# Patient Record
Sex: FEMALE | Race: WHITE | Employment: FULL TIME | URBAN - METROPOLITAN AREA
[De-identification: names, ages, dates, MRNs, and addresses within clinical notes are randomized per-mention and may not be internally consistent; named-entity substitution may affect disease eponyms.]

---

## 2017-02-14 ENCOUNTER — HOSPITAL ENCOUNTER (EMERGENCY)
Age: 32
Discharge: HOME | End: 2017-02-14
Attending: EMERGENCY MEDICINE
Payer: MEDICARE

## 2017-02-14 VITALS
WEIGHT: 230 LBS | RESPIRATION RATE: 16 BRPM | OXYGEN SATURATION: 99 % | DIASTOLIC BLOOD PRESSURE: 83 MMHG | TEMPERATURE: 98.1 F | HEART RATE: 99 BPM | BODY MASS INDEX: 43.43 KG/M2 | HEIGHT: 61 IN | SYSTOLIC BLOOD PRESSURE: 150 MMHG

## 2017-02-14 DIAGNOSIS — H92.02 EAR PAIN, LEFT: Primary | ICD-10-CM

## 2017-02-14 PROCEDURE — 99282 EMERGENCY DEPT VISIT SF MDM: CPT

## 2017-02-14 RX ORDER — TRAMADOL HYDROCHLORIDE 50 MG/1
50 TABLET ORAL EVERY 6 HOURS PRN
Qty: 10 TABLET | Refills: 0 | Status: SHIPPED | OUTPATIENT
Start: 2017-02-14 | End: 2017-02-24

## 2017-02-14 ASSESSMENT — ENCOUNTER SYMPTOMS
COUGH: 0
EYE PAIN: 0
VOMITING: 0
NAUSEA: 0
WHEEZING: 0
SORE THROAT: 0
SPUTUM PRODUCTION: 0
SHORTNESS OF BREATH: 0
ABDOMINAL PAIN: 0

## 2017-02-14 ASSESSMENT — PAIN DESCRIPTION - LOCATION: LOCATION: EAR

## 2017-02-14 ASSESSMENT — PAIN SCALES - GENERAL: PAINLEVEL_OUTOF10: 4

## 2017-02-14 ASSESSMENT — PAIN DESCRIPTION - PAIN TYPE: TYPE: ACUTE PAIN

## 2017-02-14 ASSESSMENT — PAIN DESCRIPTION - DESCRIPTORS: DESCRIPTORS: THROBBING

## 2017-02-14 ASSESSMENT — PAIN DESCRIPTION - ORIENTATION: ORIENTATION: LEFT

## 2019-11-18 ENCOUNTER — HOSPITAL ENCOUNTER (OUTPATIENT)
Age: 34
Discharge: HOME | End: 2019-11-18

## 2019-11-18 LAB — RUBV IGG SER QL: 104 IU/ML

## 2019-11-18 PROCEDURE — 86762 RUBELLA ANTIBODY: CPT

## 2019-11-18 PROCEDURE — 86481 TB AG RESPONSE T-CELL SUSP: CPT

## 2019-11-18 PROCEDURE — 86765 RUBEOLA ANTIBODY: CPT

## 2019-11-18 PROCEDURE — 86787 VARICELLA-ZOSTER ANTIBODY: CPT

## 2019-11-18 PROCEDURE — 86735 MUMPS ANTIBODY: CPT

## 2019-11-20 LAB
MEASLES IMMUNE (IGG): 2.15
MUV IGG SER QL: 0.65
VZV IGG SER QL IA: 1.38

## 2019-11-21 LAB — T-SPOT TB TEST: NORMAL

## 2020-02-18 ENCOUNTER — OFFICE VISIT (OUTPATIENT)
Dept: FAMILY MEDICINE CLINIC | Age: 35
End: 2020-02-18
Payer: COMMERCIAL

## 2020-02-18 VITALS
BODY MASS INDEX: 41.94 KG/M2 | TEMPERATURE: 97.8 F | WEIGHT: 213.6 LBS | HEART RATE: 69 BPM | HEIGHT: 60 IN | DIASTOLIC BLOOD PRESSURE: 89 MMHG | RESPIRATION RATE: 16 BRPM | SYSTOLIC BLOOD PRESSURE: 125 MMHG

## 2020-02-18 PROBLEM — Z82.49 FAMILY HX OF HYPERTENSION: Status: ACTIVE | Noted: 2020-02-18

## 2020-02-18 PROBLEM — F41.9 ANXIETY AND DEPRESSION: Status: ACTIVE | Noted: 2020-02-18

## 2020-02-18 PROBLEM — Z81.8 FAMILY HISTORY OF DEPRESSION: Status: ACTIVE | Noted: 2020-02-18

## 2020-02-18 PROBLEM — Z83.49 FAMILY HISTORY OF THYROID DISORDER: Status: ACTIVE | Noted: 2020-02-18

## 2020-02-18 PROBLEM — Z82.49 FAMILY HISTORY OF CORONARY ARTERY DISEASE: Status: ACTIVE | Noted: 2020-02-18

## 2020-02-18 PROBLEM — Z98.890 HISTORY OF REMOVAL OF URETERAL STENT: Status: ACTIVE | Noted: 2020-02-18

## 2020-02-18 PROBLEM — Z87.42 HISTORY OF PCOS: Status: ACTIVE | Noted: 2020-02-18

## 2020-02-18 PROBLEM — Z90.49 HX OF APPENDECTOMY: Status: ACTIVE | Noted: 2020-02-18

## 2020-02-18 PROBLEM — F32.A ANXIETY AND DEPRESSION: Status: ACTIVE | Noted: 2020-02-18

## 2020-02-18 PROBLEM — Z84.1 FAMILY HISTORY OF KIDNEY STONE: Status: ACTIVE | Noted: 2020-02-18

## 2020-02-18 PROCEDURE — 99204 OFFICE O/P NEW MOD 45 MIN: CPT | Performed by: PHYSICIAN ASSISTANT

## 2020-02-18 PROCEDURE — G0444 DEPRESSION SCREEN ANNUAL: HCPCS | Performed by: PHYSICIAN ASSISTANT

## 2020-02-18 RX ORDER — SPIRONOLACTONE 50 MG/1
50 TABLET, FILM COATED ORAL DAILY
COMMUNITY
End: 2020-12-14

## 2020-02-18 RX ORDER — IBUPROFEN 800 MG/1
800 TABLET ORAL EVERY 8 HOURS PRN
Qty: 120 TABLET | Refills: 0 | Status: SHIPPED | OUTPATIENT
Start: 2020-02-18 | End: 2021-02-10 | Stop reason: SDUPTHER

## 2020-02-18 RX ORDER — PAROXETINE HYDROCHLORIDE 20 MG/1
20 TABLET, FILM COATED ORAL DAILY
Qty: 30 TABLET | Refills: 3 | Status: SHIPPED | OUTPATIENT
Start: 2020-02-18 | End: 2020-09-21 | Stop reason: SDUPTHER

## 2020-02-18 RX ORDER — IBUPROFEN 800 MG/1
800 TABLET ORAL EVERY 8 HOURS PRN
Qty: 30 TABLET | Refills: 0 | Status: SHIPPED | OUTPATIENT
Start: 2020-02-18 | End: 2020-02-18 | Stop reason: SDUPTHER

## 2020-02-18 RX ORDER — IBUPROFEN 800 MG/1
800 TABLET ORAL EVERY 6 HOURS PRN
COMMUNITY
End: 2020-02-18 | Stop reason: SDUPTHER

## 2020-02-18 SDOH — ECONOMIC STABILITY: TRANSPORTATION INSECURITY
IN THE PAST 12 MONTHS, HAS LACK OF TRANSPORTATION KEPT YOU FROM MEETINGS, WORK, OR FROM GETTING THINGS NEEDED FOR DAILY LIVING?: NO

## 2020-02-18 SDOH — ECONOMIC STABILITY: FOOD INSECURITY: WITHIN THE PAST 12 MONTHS, YOU WORRIED THAT YOUR FOOD WOULD RUN OUT BEFORE YOU GOT MONEY TO BUY MORE.: NEVER TRUE

## 2020-02-18 SDOH — ECONOMIC STABILITY: TRANSPORTATION INSECURITY
IN THE PAST 12 MONTHS, HAS THE LACK OF TRANSPORTATION KEPT YOU FROM MEDICAL APPOINTMENTS OR FROM GETTING MEDICATIONS?: NO

## 2020-02-18 SDOH — ECONOMIC STABILITY: FOOD INSECURITY: WITHIN THE PAST 12 MONTHS, THE FOOD YOU BOUGHT JUST DIDN'T LAST AND YOU DIDN'T HAVE MONEY TO GET MORE.: NEVER TRUE

## 2020-02-18 SDOH — ECONOMIC STABILITY: INCOME INSECURITY: HOW HARD IS IT FOR YOU TO PAY FOR THE VERY BASICS LIKE FOOD, HOUSING, MEDICAL CARE, AND HEATING?: NOT HARD AT ALL

## 2020-02-18 ASSESSMENT — ENCOUNTER SYMPTOMS
PHOTOPHOBIA: 0
DIARRHEA: 0
BLOOD IN STOOL: 0
BACK PAIN: 0
COLOR CHANGE: 0
ABDOMINAL PAIN: 0
RHINORRHEA: 0
SHORTNESS OF BREATH: 0
NAUSEA: 0
VOMITING: 0
WHEEZING: 0
SINUS PRESSURE: 0
EYE REDNESS: 0
TROUBLE SWALLOWING: 0
CONSTIPATION: 0
ABDOMINAL DISTENTION: 0
CHEST TIGHTNESS: 0
SORE THROAT: 0
COUGH: 0
SINUS PAIN: 0

## 2020-02-18 ASSESSMENT — PATIENT HEALTH QUESTIONNAIRE - PHQ9
9. THOUGHTS THAT YOU WOULD BE BETTER OFF DEAD, OR OF HURTING YOURSELF: 0
7. TROUBLE CONCENTRATING ON THINGS, SUCH AS READING THE NEWSPAPER OR WATCHING TELEVISION: 1
3. TROUBLE FALLING OR STAYING ASLEEP: 3
5. POOR APPETITE OR OVEREATING: 1
1. LITTLE INTEREST OR PLEASURE IN DOING THINGS: 0
8. MOVING OR SPEAKING SO SLOWLY THAT OTHER PEOPLE COULD HAVE NOTICED. OR THE OPPOSITE, BEING SO FIGETY OR RESTLESS THAT YOU HAVE BEEN MOVING AROUND A LOT MORE THAN USUAL: 0
10. IF YOU CHECKED OFF ANY PROBLEMS, HOW DIFFICULT HAVE THESE PROBLEMS MADE IT FOR YOU TO DO YOUR WORK, TAKE CARE OF THINGS AT HOME, OR GET ALONG WITH OTHER PEOPLE: 2
4. FEELING TIRED OR HAVING LITTLE ENERGY: 3
SUM OF ALL RESPONSES TO PHQ QUESTIONS 1-9: 13
6. FEELING BAD ABOUT YOURSELF - OR THAT YOU ARE A FAILURE OR HAVE LET YOURSELF OR YOUR FAMILY DOWN: 3
2. FEELING DOWN, DEPRESSED OR HOPELESS: 2
SUM OF ALL RESPONSES TO PHQ QUESTIONS 1-9: 13
SUM OF ALL RESPONSES TO PHQ9 QUESTIONS 1 & 2: 2

## 2020-02-18 NOTE — PROGRESS NOTES
disturbance. Respiratory: Negative for cough, chest tightness, shortness of breath and wheezing. Cardiovascular: Negative for chest pain, palpitations and leg swelling. Gastrointestinal: Negative for abdominal distention, abdominal pain, blood in stool, constipation, diarrhea, nausea and vomiting. Endocrine: Negative. Genitourinary: Negative for decreased urine volume, difficulty urinating, dysuria, frequency, hematuria and urgency. Musculoskeletal: Positive for neck pain ( chronic and unchanged). Negative for arthralgias, back pain, gait problem, joint swelling, myalgias and neck stiffness. Skin: Negative for color change, pallor and rash. Neurological: Positive for headaches. Negative for dizziness, tremors, seizures, syncope, facial asymmetry, speech difficulty, weakness, light-headedness and numbness. Hematological: Negative for adenopathy. Does not bruise/bleed easily. Psychiatric/Behavioral: Positive for dysphoric mood and sleep disturbance. Negative for agitation, behavioral problems, confusion, decreased concentration, hallucinations, self-injury and suicidal ideas. The patient is nervous/anxious. The patient is not hyperactive.        ______________________________________________________________________  Physical Exam  Vitals signs and nursing note reviewed. Constitutional:       General: She is not in acute distress. Appearance: Normal appearance. She is well-developed and well-groomed. She is not ill-appearing, toxic-appearing or diaphoretic. HENT:      Head: Normocephalic and atraumatic. Right Ear: Tympanic membrane, ear canal and external ear normal.      Left Ear: Tympanic membrane, ear canal and external ear normal.      Nose: Nose normal.      Mouth/Throat:      Lips: Pink. Mouth: Mucous membranes are moist.      Pharynx: Oropharynx is clear. Uvula midline. No oropharyngeal exudate or posterior oropharyngeal erythema.       Tonsils: No tonsillar exudate or medical examination to establish care  -     CBC Auto Differential; Future  -     Comprehensive Metabolic Panel; Future  -     Vitamin D 25 Hydroxy; Future  -     Hemoglobin A1C; Future  -     TSH With Reflex Ft4; Future    Screening for diabetes mellitus (DM)  -     Hemoglobin A1C; Future    Thyroid disorder screen  -     TSH With Reflex Ft4; Future    Anxiety and depression  -     PARoxetine (PAXIL) 20 MG tablet; Take 1 tablet by mouth daily    Lipid screening  -     Lipid, Fasting; Future    History of PCOS    Family hx of hypertension    Family history of coronary artery disease    Family history of depression    Family history of thyroid disorder    History of removal of ureteral stent    Family history of kidney stone    Hx of appendectomy    Pap smear for cervical cancer screening  -     Megan Esteban, CNP, OB/GYN, [place]    Chronic neck pain  -     ibuprofen (ADVIL;MOTRIN) 800 MG tablet; Take 1 tablet by mouth every 8 hours as needed for Pain    Thyroid fullness  -     US THYROID; Future    Snoring  -     Baseline Diagnostic Sleep Study; Future    Sleep disturbance  -     Baseline Diagnostic Sleep Study; Future    Other orders  -     Discontinue: ibuprofen (ADVIL;MOTRIN) 800 MG tablet; Take 1 tablet by mouth every 8 hours as needed for Pain        ______________________________________________________________________  Follow up and instructions:  · Return in about 6 months (around 8/18/2020), or if symptoms worsen or fail to improve. · Discussed use, benefit, and side effects of prescribed medications. Barriers to medication compliance addressed. All patient questions answered. Pt voiced understanding. · Patient given educational materials - see patient instructions    · Patient instructed to call the office or go directly to the ER for any worsening problems or concerns. Patient voiced understanding    Jos Subramanian.  [address]  2/18/2020, 1:47 PM    This note is created with the assistance of a speech-recognition program. While intending to generate a document that actually reflects the content of the visit, the document can still have some mistakes which may not have been identified and corrected by editing.

## 2020-02-21 ENCOUNTER — HOSPITAL ENCOUNTER (OUTPATIENT)
Age: 35
Setting detail: SPECIMEN
Discharge: HOME | End: 2020-02-21
Payer: COMMERCIAL

## 2020-02-21 ENCOUNTER — HOSPITAL ENCOUNTER (OUTPATIENT)
Dept: ULTRASOUND IMAGING | Facility: CLINIC | Age: 35
Discharge: HOME | End: 2020-02-23
Payer: COMMERCIAL

## 2020-02-21 LAB
ABSOLUTE EOS #: 0.11 K/UL (ref 0–0.44)
ABSOLUTE IMMATURE GRANULOCYTE: 0.04 K/UL (ref 0–0.3)
ABSOLUTE LYMPH #: 2.3 K/UL (ref 1.1–3.7)
ABSOLUTE MONO #: 0.64 K/UL (ref 0.1–1.2)
ALBUMIN SERPL-MCNC: 4.1 G/DL (ref 3.5–5.2)
ALBUMIN/GLOBULIN RATIO: 1.4 (ref 1–2.5)
ALP BLD-CCNC: 95 U/L (ref 35–104)
ALT SERPL-CCNC: 9 U/L (ref 5–33)
ANION GAP SERPL CALCULATED.3IONS-SCNC: 14 MMOL/L (ref 9–17)
AST SERPL-CCNC: 9 U/L
BASOPHILS # BLD: 1 % (ref 0–2)
BASOPHILS ABSOLUTE: 0.04 K/UL (ref 0–0.2)
BILIRUB SERPL-MCNC: 0.52 MG/DL (ref 0.3–1.2)
BUN BLDV-MCNC: 14 MG/DL (ref 6–20)
BUN/CREAT BLD: NORMAL (ref 9–20)
CALCIUM SERPL-MCNC: 9.3 MG/DL (ref 8.6–10.4)
CHLORIDE BLD-SCNC: 105 MMOL/L (ref 98–107)
CHOLESTEROL, FASTING: 192 MG/DL
CHOLESTEROL/HDL RATIO: 6
CO2: 24 MMOL/L (ref 20–31)
CREAT SERPL-MCNC: 0.72 MG/DL (ref 0.5–0.9)
DIFFERENTIAL TYPE: ABNORMAL
EOSINOPHILS RELATIVE PERCENT: 1 % (ref 1–4)
ESTIMATED AVERAGE GLUCOSE: 117 MG/DL
GFR AFRICAN AMERICAN: >60 ML/MIN
GFR NON-AFRICAN AMERICAN: >60 ML/MIN
GFR SERPL CREATININE-BSD FRML MDRD: NORMAL ML/MIN/{1.73_M2}
GFR SERPL CREATININE-BSD FRML MDRD: NORMAL ML/MIN/{1.73_M2}
GLUCOSE BLD-MCNC: 93 MG/DL (ref 70–99)
HBA1C MFR BLD: 5.7 % (ref 4–6)
HCT VFR BLD CALC: 45 % (ref 36.3–47.1)
HDLC SERPL-MCNC: 32 MG/DL
HEMOGLOBIN: 13.7 G/DL (ref 11.9–15.1)
IMMATURE GRANULOCYTES: 1 %
LDL CHOLESTEROL: 136 MG/DL (ref 0–130)
LYMPHOCYTES # BLD: 26 % (ref 24–43)
MCH RBC QN AUTO: 28 PG (ref 25.2–33.5)
MCHC RBC AUTO-ENTMCNC: 30.4 G/DL (ref 28.4–34.8)
MCV RBC AUTO: 91.8 FL (ref 82.6–102.9)
MONOCYTES # BLD: 7 % (ref 3–12)
NRBC AUTOMATED: 0 PER 100 WBC
PDW BLD-RTO: 13.9 % (ref 11.8–14.4)
PLATELET # BLD: 310 K/UL (ref 138–453)
PLATELET ESTIMATE: ABNORMAL
PMV BLD AUTO: 10.1 FL (ref 8.1–13.5)
POTASSIUM SERPL-SCNC: 4.2 MMOL/L (ref 3.7–5.3)
RBC # BLD: 4.9 M/UL (ref 3.95–5.11)
RBC # BLD: ABNORMAL 10*6/UL
SEG NEUTROPHILS: 64 % (ref 36–65)
SEGMENTED NEUTROPHILS ABSOLUTE COUNT: 5.63 K/UL (ref 1.5–8.1)
SODIUM BLD-SCNC: 143 MMOL/L (ref 135–144)
TOTAL PROTEIN: 7 G/DL (ref 6.4–8.3)
TRIGLYCERIDE, FASTING: 118 MG/DL
TSH SERPL DL<=0.05 MIU/L-ACNC: 1.59 MIU/L (ref 0.3–5)
VITAMIN D 25-HYDROXY: 9.5 NG/ML (ref 30–100)
VLDLC SERPL CALC-MCNC: ABNORMAL MG/DL (ref 1–30)
WBC # BLD: 8.8 K/UL (ref 3.5–11.3)
WBC # BLD: ABNORMAL 10*3/UL

## 2020-02-21 PROCEDURE — 76536 US EXAM OF HEAD AND NECK: CPT

## 2020-02-24 ENCOUNTER — HOSPITAL ENCOUNTER (OUTPATIENT)
Dept: SLEEP CENTER | Age: 35
Discharge: HOME | End: 2020-02-26
Payer: COMMERCIAL

## 2020-02-24 VITALS — RESPIRATION RATE: 12 BRPM | BODY MASS INDEX: 41.82 KG/M2 | WEIGHT: 213 LBS | HEIGHT: 60 IN | HEART RATE: 50 BPM

## 2020-02-24 PROCEDURE — 95811 POLYSOM 6/>YRS CPAP 4/> PARM: CPT

## 2020-02-24 PROCEDURE — 95810 POLYSOM 6/> YRS 4/> PARAM: CPT

## 2020-02-24 PROCEDURE — 95811 POLYSOM 6/>YRS CPAP 4/> PARM: CPT | Performed by: PSYCHIATRY & NEUROLOGY

## 2020-02-25 ENCOUNTER — HOSPITAL ENCOUNTER (OUTPATIENT)
Age: 35
Setting detail: SPECIMEN
Discharge: HOME | End: 2020-02-25
Payer: COMMERCIAL

## 2020-02-25 ENCOUNTER — OFFICE VISIT (OUTPATIENT)
Dept: OBGYN CLINIC | Age: 35
End: 2020-02-25
Payer: COMMERCIAL

## 2020-02-25 VITALS
RESPIRATION RATE: 16 BRPM | HEIGHT: 60 IN | SYSTOLIC BLOOD PRESSURE: 116 MMHG | BODY MASS INDEX: 41.87 KG/M2 | DIASTOLIC BLOOD PRESSURE: 70 MMHG | WEIGHT: 213.25 LBS

## 2020-02-25 PROBLEM — F41.8 MIXED ANXIETY AND DEPRESSIVE DISORDER: Status: ACTIVE | Noted: 2020-02-18

## 2020-02-25 LAB
DIRECT EXAM: ABNORMAL
Lab: ABNORMAL
SPECIMEN DESCRIPTION: ABNORMAL

## 2020-02-25 PROCEDURE — 99385 PREV VISIT NEW AGE 18-39: CPT | Performed by: OBSTETRICS & GYNECOLOGY

## 2020-02-26 ENCOUNTER — TELEPHONE (OUTPATIENT)
Dept: FAMILY MEDICINE CLINIC | Age: 35
End: 2020-02-26

## 2020-02-26 PROBLEM — E55.9 VITAMIN D DEFICIENCY: Status: ACTIVE | Noted: 2020-02-26

## 2020-02-26 PROBLEM — G47.33 OSA (OBSTRUCTIVE SLEEP APNEA): Status: ACTIVE | Noted: 2020-02-26

## 2020-02-26 PROBLEM — E04.1 THYROID CYST: Status: ACTIVE | Noted: 2020-02-26

## 2020-02-26 PROBLEM — E78.00 HYPERCHOLESTEREMIA: Status: ACTIVE | Noted: 2020-02-26

## 2020-02-26 LAB
C TRACH DNA GENITAL QL NAA+PROBE: NEGATIVE
N. GONORRHOEAE DNA: NEGATIVE
SPECIMEN DESCRIPTION: NORMAL

## 2020-02-26 RX ORDER — ERGOCALCIFEROL 1.25 MG/1
50000 CAPSULE ORAL WEEKLY
Qty: 12 CAPSULE | Refills: 0 | Status: SHIPPED | OUTPATIENT
Start: 2020-02-26 | End: 2020-12-14

## 2020-02-26 RX ORDER — MELATONIN
1000 DAILY
Qty: 30 TABLET | Refills: 5 | Status: SHIPPED | OUTPATIENT
Start: 2020-02-26 | End: 2021-02-10 | Stop reason: SDUPTHER

## 2020-02-26 NOTE — TELEPHONE ENCOUNTER
Patient called with results. Patient's cholesterol levels are borderline. Recommended diet and exercise modifications and recheck levels in 6 months. Vitamin D deficiency noted. Will give 50,000 units vitamin D weekly for 12 weeks and then maintenance dose of 1000 units daily. Prescription sent to pharmacy. We will recheck levels in 6 months. Patient tested positive for obstructive sleep apnea. CPAP titration order placed. Patient's thyroid ultrasound shows benign cystic lesion with no need for follow-up unless symptoms worsen. Patient's questions were answered. Patient states understanding and agrees with plan.

## 2020-02-27 LAB
HPV SAMPLE: ABNORMAL
HPV, GENOTYPE 16: NOT DETECTED
HPV, GENOTYPE 18: NOT DETECTED
HPV, HIGH RISK OTHER: DETECTED
HPV, INTERPRETATION: ABNORMAL
SPECIMEN DESCRIPTION: ABNORMAL

## 2020-02-28 NOTE — PROGRESS NOTES
History and Physical  [facility]  Jerry Robles  2020              27 y.o. Chief Complaint   Patient presents with    Gynecologic Exam     Last pap 2013    Vaginal Discharge    Menstrual Problem     hx of irregular periods- were normal for a while but now are going back to every 3 months        Patient's last menstrual period was 2020 (exact date). Primary Care Physician: Katelynn LÓPEZ PA-C    HPI : Jerry Robles is a 27 y.o. female [id number]    Patient doing well  Presents for routine annual exam with complaint of noticing her cycles are getting more irregular. States she used to have very irregular unpredictable cycles q 3 months, then after her last pregnancy in , cycles were every month. Now for the past few months, she is skipping cycles and when she goes more than three months, cycles are getting heavier and with more clots. Patient would like to discuss IUD for her irregular heavy cycles.    ________________________________________________________________________  OB History    Para Term  AB Living   6 4 4 0 2 4   SAB TAB Ectopic Molar Multiple Live Births   2 0 0 0 0 4      # Outcome Date GA Lbr Sriram/2nd Weight Sex Delivery Anes PTL Lv   6 Term 13 40w0d  9 lb 10 oz (4.366 kg) M Vag-Spont   PATRICIA   5 Term 02/15/11 40w0d  7 lb 8 oz (3.402 kg) M Vag-Spont   PATRICIA   4 Term 09 40w0d  7 lb 6 oz (3.345 kg) M Vag-Spont   PATRICIA   3 Term 2006 40w0d  7 lb (3.175 kg) M Vag-Spont   PATRICIA   2 SAB      SAB      1 SAB      SAB        Past Medical History:   Diagnosis Date    Depression     Irregular periods                                                                    Past Surgical History:   Procedure Laterality Date    APPENDECTOMY      KIDNEY SURGERY Bilateral      Family History   Problem Relation Age of Onset    Breast Cancer Maternal Cousin 55        2nd degree     Social History     Socioeconomic History    Marital status: Legally  Spouse name: Not on file    Number of children: Not on file    Years of education: Not on file    Highest education level: Not on file   Occupational History    Not on file   Social Needs    Financial resource strain: Not hard at all    Food insecurity:     Worry: Never true     Inability: Never true   [employer] needs:     Medical: No     Non-medical: No   Tobacco Use    Smoking status: Never Smoker    Smokeless tobacco: Never Used   Substance and Sexual Activity    Alcohol use: Yes    Drug use: No    Sexual activity: Yes     Partners: Male   Lifestyle    Physical activity:     Days per week: Not on file     Minutes per session: Not on file    Stress: Not on file   Relationships    Social connections:     Talks on phone: Not on file     Gets together: Not on file     Attends Baptism service: Not on file     Active member of club or organization: Not on file     Attends meetings of clubs or organizations: Not on file     Relationship status: Not on file    Intimate partner violence:     Fear of current or ex partner: Not on file     Emotionally abused: Not on file     Physically abused: Not on file     Forced sexual activity: Not on file   Other Topics Concern    Not on file   Social History Narrative    Not on file       MEDICATIONS:  Current Outpatient Medications   Medication Sig Dispense Refill    vitamin D (ERGOCALCIFEROL) 1.25 MG (97930 UT) CAPS capsule Take 1 capsule by mouth once a week 12 capsule 0    Cholecalciferol (VITAMIN D3) 25 MCG (1000 UT) TABS Take 1 tablet by mouth daily 30 tablet 5    spironolactone (ALDACTONE) 50 MG tablet Take 50 mg by mouth daily      PARoxetine (PAXIL) 20 MG tablet Take 1 tablet by mouth daily 30 tablet 3    ibuprofen (ADVIL;MOTRIN) 800 MG tablet Take 1 tablet by mouth every 8 hours as needed for Pain 120 tablet 0     No current facility-administered medications for this visit.             ALLERGIES:  Allergies as of 02/25/2020    (No Known BP: 116/70   Site: Left Upper Arm   Position: Sitting   Cuff Size: Large Adult   Resp: 16   Weight: 213 lb 4 oz (96.7 kg)   Height: 5' (1.524 m)         General Appearance: This  is a well Developed, well Nourished, well groomed female. Skin:  There was a Normal Inspection of the skin without rashes or lesions. Lymphatic:  No Lymph Nodes were Palpable in the neck , axilla or groin. Neck and EENT:  The neck was supple. There were no masses   Extremities: The patients extremities were without calf tenderness, edema, or varicosities. Abdomen: The abdomen was soft and non-tender. There were good bowel sounds in all quadrants and there was no guarding, rebound or rigidity. Psych: The patient had a normal Orientation to: Time, Place, Person, and Situation  There is no Mood / Affect changes  Breast:  (Chest)  normal appearance, no masses or tenderness, Taught monthly breast self examination, negative findings: normal in size and symmetry  Self breast exams were reviewed in detail. Literature was given. Pelvic Exam:  Vulva and vagina appear normal. Bimanual exam reveals normal uterus and adnexa. External genitalia: normal general appearance  Vaginal: normal mucosa without prolapse or lesions, normal without tenderness, induration or masses and normal rugae  Cervix: normal appearance  Adnexa: normal bimanual exam  Uterus: normal single, nontender and anteverted  Musculosk:  Normal Gait and station was noted. ASSESSMENT:  Dysmenorrhea  oligomenorrhea      27 y.o. Annual   Diagnosis Orders   1. Women's annual routine gynecological examination  PAP Smear   2. Irregular periods  hCG, Quantitative, Pregnancy   3.  Vaginal discharge  VAGINITIS DNA PROBE    C.trachomatis N.gonorrhoeae DNA          Chief Complaint   Patient presents with    Gynecologic Exam     Last pap 2013    Vaginal Discharge    Menstrual Problem     hx of irregular periods- were normal for a while but now are going back to every 3 Type     Answer: Thin Prep     Order Specific Question:   Prior Abnormal Pap Test     Answer:   No     Order Specific Question:   Screening or Diagnostic     Answer:   Screening     Order Specific Question:   HPV Requested?      Answer:   Yes     Order Specific Question:   High Risk Patient     Answer:   N/A    hCG, Quantitative, Pregnancy     Standing Status:   Future     Standing Expiration Date:   2/25/2021

## 2020-03-02 ENCOUNTER — HOSPITAL ENCOUNTER (OUTPATIENT)
Age: 35
Setting detail: SPECIMEN
Discharge: HOME | End: 2020-03-02
Payer: COMMERCIAL

## 2020-03-02 LAB — HCG QUANTITATIVE: <1 IU/L

## 2020-03-03 ENCOUNTER — PROCEDURE VISIT (OUTPATIENT)
Dept: OBGYN CLINIC | Age: 35
End: 2020-03-03
Payer: COMMERCIAL

## 2020-03-03 VITALS
BODY MASS INDEX: 41.03 KG/M2 | DIASTOLIC BLOOD PRESSURE: 74 MMHG | WEIGHT: 209 LBS | RESPIRATION RATE: 16 BRPM | SYSTOLIC BLOOD PRESSURE: 114 MMHG | HEIGHT: 60 IN

## 2020-03-03 PROCEDURE — 58300 INSERT INTRAUTERINE DEVICE: CPT | Performed by: OBSTETRICS & GYNECOLOGY

## 2020-03-04 LAB
CYTOLOGY REPORT: NORMAL
STATUS: NORMAL

## 2020-03-04 NOTE — PROGRESS NOTES
IUD Insertion Note        Laura Preson  3/3/2020  Patient's last menstrual period was 02/07/2020 (exact date). HPI: The patient is requesting that a Mirena IUD be inserted for Dysmenorrhea. She is known to have painful menses that interfere with her ADL's. She wishes to continue to utilize barrier contraception for family planning and STD precautions. The patient was counseled on the procedure. Risks, benefits and alternatives were reviewed. The side effect profile of the IUD was reviewed. A consent was reviewed and obtained. The patient was counseled on the need for string checks after her menses and coital activity. She is to notify the office if she can not locate the IUD string at anytime. She is aware that she will need a speculum exam and possibly an ultrasound to confirm the proper orientation of the IUD. She has no other chief complaint today. All other forms of family planning and options for treatment of her dysmennorhea were reviewed with the patient. She is not a smoker. The patient denies any family member or herself as having a blood clot in their leg, lung, brain or that any member of her family has had a sudden cardiac death under the age of 35 yo. Past Medical History:   Diagnosis Date    Depression     Irregular periods        Past Surgical History:   Procedure Laterality Date    APPENDECTOMY      KIDNEY SURGERY Bilateral        Social History     Tobacco Use    Smoking status: Never Smoker    Smokeless tobacco: Never Used   Substance Use Topics    Alcohol use:  Yes    Drug use: No           MEDICATIONS:  Current Outpatient Medications   Medication Sig Dispense Refill    vitamin D (ERGOCALCIFEROL) 1.25 MG (72022 UT) CAPS capsule Take 1 capsule by mouth once a week 12 capsule 0    Cholecalciferol (VITAMIN D3) 25 MCG (1000 UT) TABS Take 1 tablet by mouth daily 30 tablet 5    spironolactone (ALDACTONE) 50 MG tablet Take 50 mg by mouth daily      PARoxetine (PAXIL) 20 MG tablet Take 1 tablet by mouth daily 30 tablet 3    ibuprofen (ADVIL;MOTRIN) 800 MG tablet Take 1 tablet by mouth every 8 hours as needed for Pain 120 tablet 0     Current Facility-Administered Medications   Medication Dose Route Frequency Provider Last Rate Last Dose    levonorgestrel (MIRENA) IUD 52 mg 1 each  1 each Intrauterine Once Esther Fu, DO             ALLERGIES:  Allergies as of 03/03/2020    (No Known Allergies)         Vitals:    03/03/20 1416   BP: 114/74   Site: Left Upper Arm   Position: Sitting   Cuff Size: Large Adult   Resp: 16   Weight: 209 lb (94.8 kg)   Height: 5' (1.524 m)       Urine pregnancy test: negative  Cultures Completed and were negative    The patient was positioned comfortably on the exam table. After a bi-manual exam; the uterus was found to be  anteverted. There was no cervical motion tenderness or adnexal masses. The bladder was smooth, non-tender and without palpable masses. A sterile speculum was placed without incident. The site was then cleansed with betadine and the uterus was sounded to 7 cm. The Mirena IUD was opened and loaded into the delivery system. The wand was inserted to just past the internal portio and the button was retracted to the first line. The wand was held in place for 10 seconds and then the button was retracted to its final position while the IUD was moved to the fundus. The string was trimmed in standard fashion. Post procedure restrictions were reviewed and given to the patient. She was instructed to use barrier protection for sexually transmitted disease prevention as well as string checks/timing. The patient tolerated the procedure without difficulty. She was instructed to abstain for two weeks and use nancy-pads for the first 8 weeks.  She is to notify the office or go to the nearest Emergency Department if she experiences Abdominal Pain, Temperatures more than 101 F, Odiferous Vaginal Discharge, Dizziness or Shortness of breath. ASSESSMENT:  IUD Insertion   Diagnosis Orders   1. Menorrhagia with irregular cycle  levonorgestrel (MIRENA) IUD 52 mg 1 each    [address] - [place] INSERT INTRAUTERINE DEVICE   2. IUD check up  US Pelvis Complete    US Non OB Transvaginal     Patient Active Problem List    Diagnosis Date Noted    Thyroid cyst 02/26/2020    JULIO (obstructive sleep apnea) 02/26/2020    Vitamin D deficiency 02/26/2020    Hypercholesteremia 02/26/2020    Irregular periods     Mixed anxiety and depressive disorder 02/18/2020    History of PCOS 02/18/2020    History of appendectomy 02/18/2020    Family history of renal stone 02/18/2020    History of removal of ureteral stent 02/18/2020    Family history of thyroid disease 02/18/2020    FH: depression 02/18/2020    Family history of coronary artery disease 02/18/2020    FH: hypertension 02/18/2020     Family Planning    reports that she has never smoked. She has never used smokeless tobacco.      PLAN:  Family Planning Counseling Completed  Barrier Recommendations  Removal of the Mirena IUD will be in 5 years    Annual health follow-up   Return to office in 6 weeks for Ultrasound for IUD confirmation orientation and location.       Orders Placed This Encounter   Procedures    US Pelvis Complete     Standing Status:   Future     Standing Expiration Date:   3/3/2021    US Non OB Transvaginal     Order Specific Question:   Reason for exam:     Answer:   IUD placement check in 6-12 weeks post placement    [address] - [place] INSERT INTRAUTERINE DEVICE

## 2020-03-05 ENCOUNTER — TELEPHONE (OUTPATIENT)
Dept: OBGYN CLINIC | Age: 35
End: 2020-03-05

## 2020-03-05 NOTE — TELEPHONE ENCOUNTER
----- Message from Jeannie Pablo, DO sent at 3/5/2020 10:05 AM EST -----  Needs colposcopy  ? ??possibly schedule at time of her IUD US follow up

## 2020-04-14 ENCOUNTER — HOSPITAL ENCOUNTER (OUTPATIENT)
Age: 35
Setting detail: SPECIMEN
Discharge: HOME | End: 2020-04-14
Payer: COMMERCIAL

## 2020-04-14 ENCOUNTER — PROCEDURE VISIT (OUTPATIENT)
Dept: OBGYN CLINIC | Age: 35
End: 2020-04-14
Payer: COMMERCIAL

## 2020-04-14 VITALS
BODY MASS INDEX: 41.03 KG/M2 | HEIGHT: 60 IN | SYSTOLIC BLOOD PRESSURE: 116 MMHG | RESPIRATION RATE: 16 BRPM | WEIGHT: 209 LBS | DIASTOLIC BLOOD PRESSURE: 70 MMHG

## 2020-04-14 PROBLEM — R87.612 LGSIL OF CERVIX OF UNDETERMINED SIGNIFICANCE: Status: ACTIVE | Noted: 2020-04-14

## 2020-04-14 PROBLEM — B97.7 HPV IN FEMALE: Status: ACTIVE | Noted: 2020-04-14

## 2020-04-14 PROCEDURE — 57454 BX/CURETT OF CERVIX W/SCOPE: CPT | Performed by: OBSTETRICS & GYNECOLOGY

## 2020-04-14 NOTE — PROGRESS NOTES
4/14/2020    [facility]    Patient presents for Colposcopy for LGSIL + HR HPV on pap smear. Patient had TVUS to evaluate IUD placement as well. IUD in uterus and appropriately within endometrium  Patient spotting since IUD placed. No questions or concerns prior to procedure    Chief Complaint   Patient presents with    Procedure     COLP-LGSIL       Blood pressure 116/70, resp. rate 16, height 5' (1.524 m), weight 209 lb (94.8 kg), not currently breastfeeding. The patient was counseled on the procedure. Risks, benefits and alternatives were reviewed. The possibility of Incomplete removal of the abnormal tissue was reviewed. The patient is aware that this is diagnostic and not curative and a second procedure may be needed. A consent was reviewed and obtained. The patient was positioned comfortably on the exam table. IUD strings visible at external OS. The cervix was cleansed with betadine and vinegar solution applied to cervix and adjacent vaginal mucosa. Cervix had normal vasculature, no punctate lesions, no mosaic changes. + acetyl white changes spanning the upper cervical OS from 11-1 oclock position. The biopsy/biopsies were then obtained from 11 and 1 o'clock areas X 2 biopsies. The samples along with ECC were obtained and sent to pathology in separate specimen containers. The sites were controlled with monsels solution for bleeding. The patient tolerated the procedure well. The biopsy sites were hemostatic at the end of the procedure. Post procedure restrictions were reviewed and given to the patient. The patient will return for a follow up visit in 1-2 weeks. Assessment:   Diagnosis Orders   1.  LGSIL of cervix of undetermined significance  Specimen to Pathology Outpatient    [place] COLPOSC,CERVIX W/ADJ VAG,W/BX & CURRETAG   2. HPV in female  Specimen to Pathology Outpatient    [place] COLPOSC,CERVIX W/ADJ VAG,W/BX & [address]     Patient Active Problem List    Diagnosis Date Noted    LGSIL of cervix of undetermined significance 04/14/2020    HPV in female 04/14/2020    Snoring     Sleep disturbance     Cyst of thyroid 02/26/2020    Obstructive sleep apnea syndrome 02/26/2020    Vitamin D deficiency 02/26/2020    Hypercholesterolemia 02/26/2020    Irregular periods     Mixed anxiety and depressive disorder 02/18/2020    History of PCOS 02/18/2020    History of appendectomy 02/18/2020    Family history of renal stone 02/18/2020    History of removal of ureteral stent 02/18/2020    Family history of thyroid disease 02/18/2020    FH: depression 02/18/2020    Family history of coronary artery disease 02/18/2020    FH: hypertension 02/18/2020     See Colposcopy diagram sheet of office procedure for date of 4/14/2020 scanned to chart    Plan:  Cervical biopsy at 6 and 1 o'clock positions  ECC collected    RTO in 1-2 Weeks  Restrictions Reviewed

## 2020-04-16 LAB — SURGICAL PATHOLOGY REPORT: NORMAL

## 2020-04-24 ENCOUNTER — TELEMEDICINE (OUTPATIENT)
Dept: OBGYN CLINIC | Age: 35
End: 2020-04-24
Payer: COMMERCIAL

## 2020-04-24 PROCEDURE — 99213 OFFICE O/P EST LOW 20 MIN: CPT | Performed by: OBSTETRICS & GYNECOLOGY

## 2020-04-24 NOTE — PROGRESS NOTES
2020    TELEHEALTH EVALUATION -- Audio/Visual (During [place] [address] public health emergency)    HPI:    Luisito Ricci (:  1985) has requested an audio/video evaluation for the following concern(s): For results: LGSIL pap smear with + HR HPV  Colposcopy results of DULCE I consistent with pap smear     Review of Systems: all ROS negative    Patient states she is not having any pain or bleeding     Prior to Visit Medications    Medication Sig Taking? Authorizing Provider   vitamin D (ERGOCALCIFEROL) 1.25 MG (27519 UT) CAPS capsule Take 1 capsule by mouth once a week  Lakeisha Martinez PA-C   Cholecalciferol (VITAMIN D3) 25 MCG (1000 UT) TABS Take 1 tablet by mouth daily  Lakeisha Martinez PA-C   spironolactone (ALDACTONE) 50 MG tablet Take 50 mg by mouth daily  Historical Provider, MD   PARoxetine (PAXIL) 20 MG tablet Take 1 tablet by mouth daily  Lakeisha Martinez PA-C   ibuprofen (ADVIL;MOTRIN) 800 MG tablet Take 1 tablet by mouth every 8 hours as needed for Pain  Lakeisha Martinez PA-C       Social History     Tobacco Use    Smoking status: Never Smoker    Smokeless tobacco: Never Used   Substance Use Topics    Alcohol use: Yes    Drug use: No          PHYSICAL EXAMINATION:  [ INSTRUCTIONS:  \"[x]\" Indicates a positive item  \"[]\" Indicates a negative item  -- DELETE ALL ITEMS NOT EXAMINED]  Vital Signs: (As obtained by patient/caregiver or practitioner observation)    Blood pressure-  Heart rate-    Respiratory rate-    Temperature-  Pulse oximetry-     Constitutional: [x] Appears well-developed and well-nourished [x] No apparent distress      [] Abnormal-   Mental status  [] Alert and awake  [] Oriented to person/place/time []Able to follow commands      Eyes:  EOM    []  Normal  [] Abnormal-  Sclera  []  Normal  [] Abnormal -         Discharge []  None visible  [] Abnormal -    HENT:   [x] Normocephalic, atraumatic.   [] Abnormal   [] Mouth/Throat: Mucous membranes are moist.     External Ears [] Normal  []

## 2020-05-01 ENCOUNTER — HOSPITAL ENCOUNTER (OUTPATIENT)
Age: 35
Discharge: HOME | End: 2020-05-01
Payer: COMMERCIAL

## 2020-05-01 PROCEDURE — U0003 INFECTIOUS AGENT DETECTION BY NUCLEIC ACID (DNA OR RNA); SEVERE ACUTE RESPIRATORY SYNDROME CORONAVIRUS 2 (SARS-COV-2) (CORONAVIRUS DISEASE [COVID-19]), AMPLIFIED PROBE TECHNIQUE, MAKING USE OF HIGH THROUGHPUT TECHNOLOGIES AS DESCRIBED BY CMS-2020-01-R: HCPCS

## 2020-05-04 ENCOUNTER — E-VISIT (OUTPATIENT)
Dept: FAMILY MEDICINE CLINIC | Age: 35
End: 2020-05-04
Payer: COMMERCIAL

## 2020-05-04 LAB — SARS-COV-2, NAA: NOT DETECTED

## 2020-05-04 PROCEDURE — 99421 OL DIG E/M SVC 5-10 MIN: CPT | Performed by: PHYSICIAN ASSISTANT

## 2020-05-05 ENCOUNTER — TELEPHONE (OUTPATIENT)
Dept: FAMILY MEDICINE CLINIC | Age: 35
End: 2020-05-05

## 2020-05-06 ENCOUNTER — NURSE TRIAGE (OUTPATIENT)
Dept: OTHER | Facility: CLINIC | Age: 35
End: 2020-05-06

## 2020-05-06 ENCOUNTER — TELEMEDICINE (OUTPATIENT)
Dept: FAMILY MEDICINE CLINIC | Age: 35
End: 2020-05-06
Payer: COMMERCIAL

## 2020-05-06 PROCEDURE — G8427 DOCREV CUR MEDS BY ELIG CLIN: HCPCS | Performed by: PHYSICIAN ASSISTANT

## 2020-05-06 PROCEDURE — 99213 OFFICE O/P EST LOW 20 MIN: CPT | Performed by: PHYSICIAN ASSISTANT

## 2020-05-06 ASSESSMENT — ENCOUNTER SYMPTOMS
CHEST TIGHTNESS: 0
BACK PAIN: 0
NAUSEA: 1
BLOOD IN STOOL: 0
CONSTIPATION: 0
EYE REDNESS: 0
VOMITING: 1
SORE THROAT: 0
EYE DISCHARGE: 0
COUGH: 1
SHORTNESS OF BREATH: 1
DIARRHEA: 1
ABDOMINAL DISTENTION: 0
RHINORRHEA: 0
VOICE CHANGE: 0
TROUBLE SWALLOWING: 0
EYE ITCHING: 0
RECTAL PAIN: 0
WHEEZING: 0
FACIAL SWELLING: 0
SINUS PAIN: 0
SINUS PRESSURE: 0
ABDOMINAL PAIN: 0
ANAL BLEEDING: 0

## 2020-05-06 NOTE — PROGRESS NOTES
[facility]   Cholecalciferol (VITAMIN D3) 25 MCG (1000 UT) TABS Take 1 tablet by mouth daily Yes Zora Bray PA-C   spironolactone (ALDACTONE) 50 MG tablet Take 50 mg by mouth daily Yes Historical Provider, MD   PARoxetine (PAXIL) 20 MG tablet Take 1 tablet by mouth daily Yes Zora Bray PA-C   ibuprofen (ADVIL;MOTRIN) 800 MG tablet Take 1 tablet by mouth every 8 hours as needed for Pain Yes Zora Bray PA-C       Social History     Tobacco Use    Smoking status: Never Smoker    Smokeless tobacco: Never Used   Substance Use Topics    Alcohol use: Yes    Drug use: No        Health Maintenance   Topic Date Due    DTaP/Tdap/Td vaccine (1 - Tdap) 12/05/2003    A1C test (Diabetic or Prediabetic)  02/21/2021    Potassium monitoring  02/21/2021    Creatinine monitoring  02/21/2021    Cervical cancer screen  02/25/2025    Flu vaccine  Completed    HIV screen  Completed    Hepatitis A vaccine  Aged Out    Hepatitis B vaccine  Aged Out    Hib vaccine  Aged Out    Meningococcal (ACWY) vaccine  Aged Out    Pneumococcal 0-64 years Vaccine  Aged Out    Varicella vaccine  Discontinued       PHYSICAL EXAMINATION:  [ INSTRUCTIONS:  \"[x]\" Indicates a positive item  \"[]\" Indicates a negative item  -- DELETE ALL ITEMS NOT EXAMINED]  Vital Signs: (As obtained by patient/caregiver or practitioner observation)    Blood pressure-  Heart rate-    Respiratory rate-    Temperature-  Pulse oximetry-     Constitutional: [x] Appears well-developed and well-nourished [x] No apparent distress      [] Abnormal-   Mental status  [x] Alert and awake  [x] Oriented to person/place/time [x]Able to follow commands      Eyes:  EOM    [x]  Normal  [] Abnormal-  Sclera  [x]  Normal  [] Abnormal -         Discharge [x]  None visible  [] Abnormal -    HENT:   [x] Normocephalic, atraumatic.   [] Abnormal   [x] Mouth/Throat: Mucous membranes are moist.     External Ears [x] Normal  [] Abnormal-     Neck: [x] No visualized mass

## 2020-05-06 NOTE — TELEPHONE ENCOUNTER
Reason for Disposition   Intermittent chest pains persist > 3 days    Answer Assessment - Initial Assessment Questions  1. LOCATION: \"Where does it hurt? \"        Middle of chest  2. RADIATION: \"Does the pain go anywhere else? \" (e.g., into neck, jaw, arms, back)      Sometimes into back  3. ONSET: \"When did the chest pain begin? \" (Minutes, hours or days)       5 days  4. PATTERN \"Does the pain come and go, or has it been constant since it started? \"  \"Does it get worse with exertion? \"       Comes and goes  5. DURATION: \"How long does it last\" (e.g., seconds, minutes, hours)        6. SEVERITY: \"How bad is the pain? \"  (e.g., Scale 1-10; mild, moderate, or severe)     - MILD (1-3): doesn't interfere with normal activities      - MODERATE (4-7): interferes with normal activities or awakens from sleep     - SEVERE (8-10): excruciating pain, unable to do any normal activities        7/10   7. CARDIAC RISK FACTORS: \"Do you have any history of heart problems or risk factors for heart disease? \" (e.g., prior heart attack, angina; high blood pressure, diabetes, being overweight, high cholesterol, smoking, or strong family history of heart disease)      Family hx  8. PULMONARY RISK FACTORS: \"Do you have any history of lung disease? \"  (e.g., blood clots in lung, asthma, emphysema, birth control pills)      no  9. CAUSE: \"What do you think is causing the chest pain? \"      unsure  10. OTHER SYMPTOMS: \"Do you have any other symptoms? \" (e.g., dizziness, nausea, vomiting, sweating, fever, difficulty breathing, cough)        Sweating, nausea, SOB with exertion, painful with deep breath in  11. PREGNANCY: \"Is there any chance you are pregnant? \" \"When was your last menstrual period? \"        No, IUD    Protocols used: CHEST PAIN-ADULT-[place]

## 2020-05-06 NOTE — TELEPHONE ENCOUNTER
C/o chest burning x 5 days, getting progressively worse. Hurts more to take deep breath in. Chills and body aches, no fever per pt. No cough. Advised to do VV with PCP, verbalized understanding.  Transferred to psc

## 2020-05-08 ENCOUNTER — TELEPHONE (OUTPATIENT)
Dept: FAMILY MEDICINE CLINIC | Age: 35
End: 2020-05-08

## 2020-05-21 RX ORDER — BENZONATATE 100 MG/1
100 CAPSULE ORAL 2 TIMES DAILY PRN
Qty: 20 CAPSULE | Refills: 0 | Status: SHIPPED | OUTPATIENT
Start: 2020-05-21 | End: 2020-05-31

## 2020-05-27 ENCOUNTER — HOSPITAL ENCOUNTER (OUTPATIENT)
Age: 35
Discharge: HOME | End: 2020-05-27

## 2020-05-27 PROCEDURE — U0003 INFECTIOUS AGENT DETECTION BY NUCLEIC ACID (DNA OR RNA); SEVERE ACUTE RESPIRATORY SYNDROME CORONAVIRUS 2 (SARS-COV-2) (CORONAVIRUS DISEASE [COVID-19]), AMPLIFIED PROBE TECHNIQUE, MAKING USE OF HIGH THROUGHPUT TECHNOLOGIES AS DESCRIBED BY CMS-2020-01-R: HCPCS

## 2020-05-30 LAB — SARS-COV-2, NAA: NOT DETECTED

## 2020-05-31 ENCOUNTER — TELEPHONE (OUTPATIENT)
Dept: PRIMARY CARE CLINIC | Age: 35
End: 2020-05-31

## 2020-06-04 ENCOUNTER — PATIENT MESSAGE (OUTPATIENT)
Dept: FAMILY MEDICINE CLINIC | Age: 35
End: 2020-06-04

## 2020-06-05 ENCOUNTER — PATIENT MESSAGE (OUTPATIENT)
Dept: FAMILY MEDICINE CLINIC | Age: 35
End: 2020-06-05

## 2020-06-05 NOTE — TELEPHONE ENCOUNTER
From: Fatmata Hale  To: Callie LÓPEZ PA-C  Sent: 6/4/2020 3:30 PM EDT  Subject: Non-Urgent Medical Question    Hey! So my test showed negative, but I was waiting until I felt better to get released for work, and employee health says my pcp has to be the one to actually clear me. My coughing hasn't been bad since yesterday, and the chest pressure is finally gone, and GI issues have lessened quite a bit, so I feel like I'll be okay to return to work on Monday. Is there any way I can get a note clearing me for work for then? Thank you.

## 2020-07-14 ENCOUNTER — HOSPITAL ENCOUNTER (OUTPATIENT)
Age: 35
Discharge: HOME | End: 2020-07-14
Payer: COMMERCIAL

## 2020-07-14 ENCOUNTER — OFFICE VISIT (OUTPATIENT)
Dept: OBGYN CLINIC | Age: 35
End: 2020-07-14
Payer: COMMERCIAL

## 2020-07-14 ENCOUNTER — HOSPITAL ENCOUNTER (OUTPATIENT)
Age: 35
Setting detail: SPECIMEN
Discharge: HOME | End: 2020-07-14
Payer: COMMERCIAL

## 2020-07-14 VITALS
TEMPERATURE: 99 F | BODY MASS INDEX: 42.97 KG/M2 | SYSTOLIC BLOOD PRESSURE: 128 MMHG | WEIGHT: 220 LBS | DIASTOLIC BLOOD PRESSURE: 64 MMHG

## 2020-07-14 PROCEDURE — G8417 CALC BMI ABV UP PARAM F/U: HCPCS | Performed by: OBSTETRICS & GYNECOLOGY

## 2020-07-14 PROCEDURE — 99213 OFFICE O/P EST LOW 20 MIN: CPT | Performed by: OBSTETRICS & GYNECOLOGY

## 2020-07-14 PROCEDURE — 36415 COLL VENOUS BLD VENIPUNCTURE: CPT

## 2020-07-14 PROCEDURE — G8428 CUR MEDS NOT DOCUMENT: HCPCS | Performed by: OBSTETRICS & GYNECOLOGY

## 2020-07-14 PROCEDURE — 1036F TOBACCO NON-USER: CPT | Performed by: OBSTETRICS & GYNECOLOGY

## 2020-07-14 PROCEDURE — 86694 HERPES SIMPLEX NES ANTBDY: CPT

## 2020-07-14 RX ORDER — ACYCLOVIR 200 MG/1
200 CAPSULE ORAL
Qty: 50 CAPSULE | Refills: 2 | Status: SHIPPED | OUTPATIENT
Start: 2020-07-14 | End: 2020-07-24

## 2020-07-14 NOTE — LETTER
[facility] [address]  Phone: [phone]  Fax: [phone]    Scot Fabian DO        July 14, 2020     Patient: Nav Vincent   YOB: 1985   Date of Visit: 7/14/2020       To Whom It May Concern: It is my medical opinion that Thermon Kawasaki will be off work from 7/10-7/16/20. Patient may return to work with no restrictions on 7/17/2020. If you have any questions or concerns, please don't hesitate to call.     Sincerely,        Scot Fabian DO

## 2020-07-15 LAB
HSV 1, NAAT: POSITIVE
HSV 2, NAAT: NEGATIVE
SPECIMEN DESCRIPTION: ABNORMAL

## 2020-07-16 LAB — HERPES TYPE 1/2 IGM COMBINED: 2.21

## 2020-07-20 NOTE — PROGRESS NOTES
Obstructive sleep apnea syndrome 02/26/2020    Vitamin D deficiency 02/26/2020    Hypercholesterolemia 02/26/2020    Irregular periods     Mixed anxiety and depressive disorder 02/18/2020    History of PCOS 02/18/2020    History of appendectomy 02/18/2020    Family history of renal stone 02/18/2020    History of removal of ureteral stent 02/18/2020    Family history of thyroid disease 02/18/2020    FH: depression 02/18/2020    Family history of coronary artery disease 02/18/2020    FH: hypertension 02/18/2020       PLAN:  Recommended labs HSV I&II  Cultures collected from one of the open weeping vesicles  Rx for acyclovir 5X daily X 10 days  Will inform patient of results of labs and cultures  Recommended topical benadryl cream in the event this is not HSV but rather a severe contact dermatitis. Orders Placed This Encounter   Procedures    VAGINITIS DNA PROBE    HSV Non-Specific Antibody, IgM     Standing Status:   Future     Number of Occurrences:   1     Standing Expiration Date:   7/14/2021       Patient was seen with total face to face time of 15 minutes. More than 50% of this visit was counseling and education regarding The primary encounter diagnosis was Vaginal lesion. A diagnosis of Vagina itching was also pertinent to this visit. and Other (vag bumps ? hives?)   as well as  counseling on preventative health maintenance follow-up.

## 2020-09-21 RX ORDER — PAROXETINE HYDROCHLORIDE 20 MG/1
20 TABLET, FILM COATED ORAL DAILY
Qty: 30 TABLET | Refills: 3 | Status: SHIPPED | OUTPATIENT
Start: 2020-09-21 | End: 2021-02-10 | Stop reason: SDUPTHER

## 2020-12-14 ENCOUNTER — OFFICE VISIT (OUTPATIENT)
Dept: FAMILY MEDICINE CLINIC | Age: 35
End: 2020-12-14
Payer: COMMERCIAL

## 2020-12-14 VITALS
TEMPERATURE: 97.6 F | SYSTOLIC BLOOD PRESSURE: 126 MMHG | HEIGHT: 62 IN | HEART RATE: 76 BPM | WEIGHT: 224 LBS | OXYGEN SATURATION: 98 % | BODY MASS INDEX: 41.22 KG/M2 | DIASTOLIC BLOOD PRESSURE: 74 MMHG

## 2020-12-14 PROCEDURE — G8417 CALC BMI ABV UP PARAM F/U: HCPCS | Performed by: PHYSICIAN ASSISTANT

## 2020-12-14 PROCEDURE — G8482 FLU IMMUNIZE ORDER/ADMIN: HCPCS | Performed by: PHYSICIAN ASSISTANT

## 2020-12-14 PROCEDURE — G8427 DOCREV CUR MEDS BY ELIG CLIN: HCPCS | Performed by: PHYSICIAN ASSISTANT

## 2020-12-14 PROCEDURE — 90686 IIV4 VACC NO PRSV 0.5 ML IM: CPT | Performed by: PHYSICIAN ASSISTANT

## 2020-12-14 PROCEDURE — 1036F TOBACCO NON-USER: CPT | Performed by: PHYSICIAN ASSISTANT

## 2020-12-14 PROCEDURE — 99213 OFFICE O/P EST LOW 20 MIN: CPT | Performed by: PHYSICIAN ASSISTANT

## 2020-12-14 RX ORDER — EPINEPHRINE 0.3 MG/.3ML
0.3 INJECTION SUBCUTANEOUS ONCE
Qty: 0.3 ML | Refills: 0 | Status: SHIPPED | OUTPATIENT
Start: 2020-12-14 | End: 2022-01-27

## 2020-12-14 ASSESSMENT — ENCOUNTER SYMPTOMS
COUGH: 0
RHINORRHEA: 0
COLOR CHANGE: 0
WHEEZING: 0
RECTAL PAIN: 0
DIARRHEA: 1
NAUSEA: 0
ABDOMINAL PAIN: 0
CONSTIPATION: 0
CHEST TIGHTNESS: 0
SINUS PRESSURE: 0
VOMITING: 0
SHORTNESS OF BREATH: 0
BACK PAIN: 0
EYE REDNESS: 0
SORE THROAT: 0
BLOOD IN STOOL: 0
ABDOMINAL DISTENTION: 0
TROUBLE SWALLOWING: 0
PHOTOPHOBIA: 0
VOICE CHANGE: 0
ANAL BLEEDING: 0
SINUS PAIN: 0

## 2020-12-14 NOTE — PROGRESS NOTES
Vaccine Information Sheet, \"Influenza - Inactivated\"  given to Rhoderick Cowden, or parent/legal guardian of  Rhoderick Cowden and verbalized understanding. Patient responses:    Have you ever had a reaction to a flu vaccine? No  Do you have any current illness? No  Have you ever had Guillian [place] Syndrome? No  Do you have a serious allergy to any of the following: Neomycin, Polymyxin, Thimerosal, eggs or egg products? No    Flu vaccine given per order. Please see immunization tab. Risks and benefits explained. Current VIS given.       Immunizations Administered     Name Date Dose Route    Influenza, Quadv, IM, PF (6 mo and older Fluzone, Flulaval, Fluarix, and 3 yrs and older Afluria) 12/14/2020 0.5 mL Intramuscular    Site: Right arm    Lot: [id number]    [address]: 70241-507-03

## 2020-12-14 NOTE — PROGRESS NOTES
[address] PRIMARY CARE  [address] [place] [address] [facility]  Dept: [phone]  Dept Fax: [phone]    Office Progress Note  Date of patient's visit: 12/14/2020  Patient's Name:  Garcia Deng YOB: 1985            ROOPA LÓPEZ  ================================================================    REASON FOR VISIT/CHIEF COMPLAINT:  Allergic Reaction    HISTORY OF PRESENTING ILLNESS:  History was obtained from: patient. Garcia Deng is a 38 y.o. female who presents with c/o intermittent rash to the hands and feet and scratchy throat. Patient states that since having a viral illness in March of this year she has had intermittent problems with rash to the feet and hands and itchy throat which resolves with Benadryl. She states that she has noticed it after eating macaroni and cheese, pizza, spaghetti. Patient denies any history of previous allergies. She does have intermittent joint pain without any redness or swelling to the joints. With these episodes she has noticed some loose stools without blood but no abdominal pain or fevers.   Patient is currently asymptomatic      Patient Active Problem List   Diagnosis    Mixed anxiety and depressive disorder    History of PCOS    History of appendectomy    Family history of renal stone    History of removal of ureteral stent    Family history of thyroid disease    FH: depression    Family history of coronary artery disease    FH: hypertension    Irregular periods    Cyst of thyroid    Obstructive sleep apnea syndrome    Vitamin D deficiency    Hypercholesterolemia    Snoring    Sleep disturbance    LGSIL of cervix of undetermined significance    HPV in female       Health Maintenance Due   Topic Date Due    Flu vaccine (1) 09/01/2020       No Known Allergies      Current Outpatient Medications   Medication Sig Dispense Refill    EPINEPHrine (EPIPEN 2-ES) 0.3 MG/0.3ML SOAJ injection Inject 0.3 mLs into the skin once for 1 dose Use as directed for allergic reaction 0.3 mL 0    PARoxetine (PAXIL) 20 MG tablet Take 1 tablet by mouth daily 30 tablet 3    Cholecalciferol (VITAMIN D3) 25 MCG (1000 UT) TABS Take 1 tablet by mouth daily 30 tablet 5    ibuprofen (ADVIL;MOTRIN) 800 MG tablet Take 1 tablet by mouth every 8 hours as needed for Pain 120 tablet 0     Current Facility-Administered Medications   Medication Dose Route Frequency Provider Last Rate Last Admin    levonorgestrel (MIRENA) IUD 52 mg 1 each  1 each Intrauterine Once Myrtle Terrile, DO   1 each at 03/09/20 9479       Social History     Tobacco Use    Smoking status: Never Smoker    Smokeless tobacco: Never Used   Substance Use Topics    Alcohol use: Yes    Drug use: No       Family History   Problem Relation Age of Onset    Breast Cancer Maternal Cousin 55        2nd degree        Review of Systems   Constitutional: Negative for appetite change, chills, diaphoresis, fatigue, fever and unexpected weight change. HENT: Negative for congestion, ear discharge, ear pain, postnasal drip, rhinorrhea, sinus pressure, sinus pain, sore throat, tinnitus, trouble swallowing and voice change. Eyes: Negative for photophobia, redness and visual disturbance. Respiratory: Negative for cough, chest tightness, shortness of breath and wheezing. Cardiovascular: Negative for chest pain, palpitations and leg swelling. Gastrointestinal: Positive for diarrhea ( With episodes none currently). Negative for abdominal distention, abdominal pain, anal bleeding, blood in stool, constipation, nausea, rectal pain and vomiting. Endocrine: Negative. Genitourinary: Negative for decreased urine volume, difficulty urinating, dysuria, frequency, hematuria and urgency. Musculoskeletal: Positive for arthralgias. Negative for back pain, gait problem, joint swelling, myalgias, neck pain and neck stiffness.    Skin: Positive for rash ( With episodes none currently). Negative for color change and pallor. Neurological: Negative for dizziness, syncope, weakness, light-headedness, numbness and headaches. Hematological: Negative for adenopathy. Does not bruise/bleed easily. Psychiatric/Behavioral: Negative for agitation, behavioral problems, confusion, decreased concentration, dysphoric mood, hallucinations, self-injury, sleep disturbance and suicidal ideas. The patient is not nervous/anxious and is not hyperactive. Physical Exam  Vitals signs and nursing note reviewed. Constitutional:       General: She is not in acute distress. Appearance: Normal appearance. She is well-developed and well-groomed. She is not ill-appearing, toxic-appearing or diaphoretic. HENT:      Head: Normocephalic and atraumatic. Right Ear: Tympanic membrane, ear canal and external ear normal.      Left Ear: Tympanic membrane, ear canal and external ear normal.      Nose: Nose normal.      Mouth/Throat:      Lips: Pink. Mouth: Mucous membranes are moist.      Pharynx: Oropharynx is clear. Uvula midline. No oropharyngeal exudate or posterior oropharyngeal erythema. Tonsils: No tonsillar exudate or tonsillar abscesses. Eyes:      General: Lids are normal. No scleral icterus. Right eye: No discharge. Left eye: No discharge. Extraocular Movements: Extraocular movements intact. Conjunctiva/sclera: Conjunctivae normal.      Pupils: Pupils are equal, round, and reactive to light. Neck:      Musculoskeletal: Full passive range of motion without pain, normal range of motion and neck supple. Thyroid: No thyroid mass, thyromegaly or thyroid tenderness. Vascular: No JVD. Trachea: No tracheal deviation. Cardiovascular:      Rate and Rhythm: Normal rate and regular rhythm. Heart sounds: Normal heart sounds, S1 normal and S2 normal. No murmur. No friction rub. No gallop.     Pulmonary:      Effort: Pulmonary effort is normal. No respiratory distress. Breath sounds: Normal breath sounds and air entry. No stridor, decreased air movement or transmitted upper airway sounds. No decreased breath sounds, wheezing, rhonchi or rales. Chest:      Chest wall: No tenderness. Abdominal:      General: Abdomen is flat. Bowel sounds are normal. There is no distension. Palpations: Abdomen is soft. There is no mass. Tenderness: There is no abdominal tenderness. There is no right CVA tenderness, left CVA tenderness, guarding or rebound. Musculoskeletal: Normal range of motion. General: No swelling, tenderness, deformity or signs of injury. Right lower leg: No edema. Left lower leg: No edema. Lymphadenopathy:      Head:      Right side of head: No submental, submandibular, tonsillar, preauricular, posterior auricular or occipital adenopathy. Left side of head: No submental, submandibular, tonsillar, preauricular or posterior auricular adenopathy. Cervical: No cervical adenopathy. Right cervical: No superficial, deep or posterior cervical adenopathy. Left cervical: No superficial, deep or posterior cervical adenopathy. Upper Body:      Right upper body: No supraclavicular adenopathy. Left upper body: No supraclavicular adenopathy. Skin:     General: Skin is warm and dry. Coloration: Skin is not jaundiced or pale. Findings: No bruising, erythema, lesion or rash. Neurological:      General: No focal deficit present. Mental Status: She is alert and oriented to person, place, and time. Cranial Nerves: Cranial nerves are intact. No cranial nerve deficit. Sensory: Sensation is intact. Motor: Motor function is intact. Coordination: Coordination is intact. Coordination normal.      Gait: Gait is intact. Deep Tendon Reflexes: Reflexes are normal and symmetric.    Psychiatric:         Attention and Perception: Attention and perception normal. Mood and Affect: Mood and affect normal.         Speech: Speech normal.         Behavior: Behavior normal. Behavior is cooperative. Thought Content: Thought content normal.         Cognition and Memory: Cognition and memory normal.         Judgment: Judgment normal.         Vitals:    12/14/20 0737   BP: 126/74   Pulse: 76   Temp: 97.6 °F (36.4 °C)   SpO2: 98%   Weight: 224 lb (101.6 kg)   Height: 5' 2\" (1.575 m)     BP Readings from Last 3 Encounters:   12/14/20 126/74   07/14/20 128/64   04/14/20 116/70              DIAGNOSTIC FINDINGS:  CBC:  Lab Results   Component Value Date    WBC 8.8 02/21/2020    HGB 13.7 02/21/2020     02/21/2020       BMP:    Lab Results   Component Value Date     02/21/2020    K 4.2 02/21/2020     02/21/2020    CO2 24 02/21/2020    BUN 14 02/21/2020    CREATININE 0.72 02/21/2020    GLUCOSE 93 02/21/2020         FASTING LIPID PANEL:  Lab Results   Component Value Date    HDL 32 (L) 02/21/2020       No results found for this visit on 12/14/20. ASSESSMENT AND PLAN:   Diagnosis Orders   1. Allergic reaction, initial encounter  Yanira Denney MD, Allergy & Immunology, [place]    EPINEPHrine (EPIPEN 2-ES) 0.3 MG/0.3ML SOAJ injection    Tissue Transglutaminase Antobody IgA W/ Reflex    KIANNA Screen With Reflex    Rheumatoid Factor    Sedimentation Rate    C-Reactive Protein   2. Diarrhea, unspecified type  Tissue Transglutaminase Antobody IgA W/ Reflex   3. Arthralgia, unspecified joint  CBC Auto Differential    Comprehensive Metabolic Panel    KIANNA Screen With Reflex    Rheumatoid Factor    Sedimentation Rate    C-Reactive Protein       FOLLOW UP AND INSTRUCTIONS:  Return in about 6 months (around 6/14/2021), or if symptoms worsen or fail to improve. · Discussed use, benefit, and side effects of prescribed medications. Barriers to medication compliance addressed. All patient questions answered. Pt voiced understanding.      · Patient instructed to return to the office if symptoms do not resolve or go directly to the ER if the symptoms worsen - patient voiced understanding. · Patient given educational materials - see patient instructions    Aba Devries [facility] IlljudithSt. Francis Hospitalandres  12/14/2020, 8:03 AM    This note is created with the assistance of a speech-recognition program. While intending to generate a document that actually reflects the content of the visit, the document can still have some mistakes which may not have been identified and corrected by editing.

## 2020-12-23 ENCOUNTER — TELEPHONE (OUTPATIENT)
Dept: FAMILY MEDICINE CLINIC | Age: 35
End: 2020-12-23

## 2021-01-05 ENCOUNTER — HOSPITAL ENCOUNTER (OUTPATIENT)
Age: 36
Setting detail: SPECIMEN
Discharge: HOME | End: 2021-01-05
Payer: COMMERCIAL

## 2021-01-05 DIAGNOSIS — R19.7 DIARRHEA, UNSPECIFIED TYPE: ICD-10-CM

## 2021-01-05 DIAGNOSIS — T78.40XA ALLERGIC REACTION, INITIAL ENCOUNTER: ICD-10-CM

## 2021-01-05 DIAGNOSIS — M25.50 ARTHRALGIA, UNSPECIFIED JOINT: ICD-10-CM

## 2021-01-05 LAB
ABSOLUTE EOS #: 0.14 K/UL (ref 0–0.44)
ABSOLUTE IMMATURE GRANULOCYTE: 0.04 K/UL (ref 0–0.3)
ABSOLUTE LYMPH #: 1.91 K/UL (ref 1.1–3.7)
ABSOLUTE MONO #: 0.51 K/UL (ref 0.1–1.2)
ALBUMIN SERPL-MCNC: 3.8 G/DL (ref 3.5–5.2)
ALBUMIN/GLOBULIN RATIO: 1.4 (ref 1–2.5)
ALP BLD-CCNC: 90 U/L (ref 35–104)
ALT SERPL-CCNC: 10 U/L (ref 5–33)
ANION GAP SERPL CALCULATED.3IONS-SCNC: 13 MMOL/L (ref 9–17)
AST SERPL-CCNC: 13 U/L
BASOPHILS # BLD: 1 % (ref 0–2)
BASOPHILS ABSOLUTE: 0.05 K/UL (ref 0–0.2)
BILIRUB SERPL-MCNC: 0.31 MG/DL (ref 0.3–1.2)
BUN BLDV-MCNC: 14 MG/DL (ref 6–20)
BUN/CREAT BLD: ABNORMAL (ref 9–20)
C-REACTIVE PROTEIN: 18 MG/L (ref 0–5)
CALCIUM SERPL-MCNC: 8.9 MG/DL (ref 8.6–10.4)
CHLORIDE BLD-SCNC: 107 MMOL/L (ref 98–107)
CO2: 23 MMOL/L (ref 20–31)
CREAT SERPL-MCNC: 0.85 MG/DL (ref 0.5–0.9)
DIFFERENTIAL TYPE: ABNORMAL
EOSINOPHILS RELATIVE PERCENT: 2 % (ref 1–4)
GFR AFRICAN AMERICAN: >60 ML/MIN
GFR NON-AFRICAN AMERICAN: >60 ML/MIN
GFR SERPL CREATININE-BSD FRML MDRD: ABNORMAL ML/MIN/{1.73_M2}
GFR SERPL CREATININE-BSD FRML MDRD: ABNORMAL ML/MIN/{1.73_M2}
GLUCOSE BLD-MCNC: 108 MG/DL (ref 70–99)
HCT VFR BLD CALC: 42.3 % (ref 36.3–47.1)
HEMOGLOBIN: 12.9 G/DL (ref 11.9–15.1)
IMMATURE GRANULOCYTES: 1 %
LYMPHOCYTES # BLD: 25 % (ref 24–43)
MCH RBC QN AUTO: 27.6 PG (ref 25.2–33.5)
MCHC RBC AUTO-ENTMCNC: 30.5 G/DL (ref 28.4–34.8)
MCV RBC AUTO: 90.6 FL (ref 82.6–102.9)
MONOCYTES # BLD: 7 % (ref 3–12)
NRBC AUTOMATED: 0 PER 100 WBC
PDW BLD-RTO: 14.3 % (ref 11.8–14.4)
PLATELET # BLD: 340 K/UL (ref 138–453)
PLATELET ESTIMATE: ABNORMAL
PMV BLD AUTO: 9.6 FL (ref 8.1–13.5)
POTASSIUM SERPL-SCNC: 4.3 MMOL/L (ref 3.7–5.3)
RBC # BLD: 4.67 M/UL (ref 3.95–5.11)
RBC # BLD: ABNORMAL 10*6/UL
RHEUMATOID FACTOR: <10 IU/ML
SEDIMENTATION RATE, ERYTHROCYTE: 28 MM (ref 0–20)
SEG NEUTROPHILS: 64 % (ref 36–65)
SEGMENTED NEUTROPHILS ABSOLUTE COUNT: 4.88 K/UL (ref 1.5–8.1)
SODIUM BLD-SCNC: 143 MMOL/L (ref 135–144)
TOTAL PROTEIN: 6.6 G/DL (ref 6.4–8.3)
WBC # BLD: 7.5 K/UL (ref 3.5–11.3)
WBC # BLD: ABNORMAL 10*3/UL

## 2021-01-06 ENCOUNTER — TELEPHONE (OUTPATIENT)
Dept: FAMILY MEDICINE CLINIC | Age: 36
End: 2021-01-06

## 2021-01-06 DIAGNOSIS — R79.82 ELEVATED C-REACTIVE PROTEIN (CRP): Primary | ICD-10-CM

## 2021-01-06 DIAGNOSIS — R70.0 ELEVATED ERYTHROCYTE SEDIMENTATION RATE: ICD-10-CM

## 2021-01-06 DIAGNOSIS — M25.50 ARTHRALGIA, UNSPECIFIED JOINT: ICD-10-CM

## 2021-01-06 LAB
ANTI-NUCLEAR ANTIBODY (ANA): NEGATIVE
TISSUE TRANSGLUTAMINASE IGA: 0.3 U/ML

## 2021-01-06 NOTE — TELEPHONE ENCOUNTER
Patient returned office call in regards to labs, Patient states that she is still having symptoms and that the allergist hasn't reached out. But the referral was re faxed. Rheumatologist referral pending also.

## 2021-01-06 NOTE — TELEPHONE ENCOUNTER
I advised patient to call her insurance and see if there is anything we can do to get the pa approved and let us know what they say also she was asking about the referral and I faxed it over and let her know

## 2021-01-08 ENCOUNTER — TELEPHONE (OUTPATIENT)
Dept: FAMILY MEDICINE CLINIC | Age: 36
End: 2021-01-08

## 2021-02-04 ENCOUNTER — NURSE TRIAGE (OUTPATIENT)
Dept: OTHER | Facility: CLINIC | Age: 36
End: 2021-02-04

## 2021-02-04 ENCOUNTER — OFFICE VISIT (OUTPATIENT)
Dept: FAMILY MEDICINE CLINIC | Age: 36
End: 2021-02-04
Payer: COMMERCIAL

## 2021-02-04 VITALS
OXYGEN SATURATION: 95 % | DIASTOLIC BLOOD PRESSURE: 84 MMHG | HEART RATE: 70 BPM | HEIGHT: 60 IN | WEIGHT: 231 LBS | BODY MASS INDEX: 45.35 KG/M2 | SYSTOLIC BLOOD PRESSURE: 126 MMHG | TEMPERATURE: 97.3 F

## 2021-02-04 DIAGNOSIS — M62.830 SPASM OF THORACIC BACK MUSCLE: ICD-10-CM

## 2021-02-04 DIAGNOSIS — M25.532 LEFT WRIST PAIN: ICD-10-CM

## 2021-02-04 DIAGNOSIS — M62.838 MUSCLE SPASMS OF NECK: Primary | ICD-10-CM

## 2021-02-04 PROCEDURE — 99213 OFFICE O/P EST LOW 20 MIN: CPT | Performed by: STUDENT IN AN ORGANIZED HEALTH CARE EDUCATION/TRAINING PROGRAM

## 2021-02-04 RX ORDER — CYCLOBENZAPRINE HCL 5 MG
5 TABLET ORAL 2 TIMES DAILY PRN
Qty: 20 TABLET | Refills: 0 | Status: SHIPPED | OUTPATIENT
Start: 2021-02-04 | End: 2021-02-14

## 2021-02-04 ASSESSMENT — ENCOUNTER SYMPTOMS
CONSTIPATION: 0
CHEST TIGHTNESS: 0
DIARRHEA: 0
WHEEZING: 0
BACK PAIN: 1
SHORTNESS OF BREATH: 0
COUGH: 0
SORE THROAT: 0
ABDOMINAL PAIN: 0
VOMITING: 0
EYE DISCHARGE: 0
NAUSEA: 0
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
## 2021-02-04 NOTE — PATIENT INSTRUCTIONS
Patient Education        Back Spasm: Care Instructions  Your Care Instructions  A back spasm is sudden tightness and pain in your back muscles. It may happen from overuse or an injury. Things like sleeping in an awkward way, bending, lifting, standing, or sitting can sometimes cause a spasm. But the cause isn't always clear. Home treatment includes using heat or ice, taking over-the-counter (OTC) pain medicines, and avoiding activities that may cause back pain. For a back spasm that doesn't get better with home care, your doctor may prescribe medicine. Treatments such as massage or manipulation may also help ease a back spasm. Your doctor may also suggest exercise or physical therapy to help improve strength and flexibility in your back muscles. In most cases, getting back to your normal activities is good for your back. Just make sure to avoid doing things that make your pain worse. Follow-up care is a key part of your treatment and safety. Be sure to make and go to all appointments, and call your doctor if you are having problems. It's also a good idea to know your test results and keep a list of the medicines you take. How can you care for yourself at home? Heat, ice, and medicines    · To relieve pain, use heat or ice (whichever feels better) on the affected area. ? Put a warm water bottle, a heating pad set on low, or a warm cloth on your back. Put a thin cloth between the heating pad and your skin. Do not go to sleep with a heating pad on your skin. ? Try ice or a cold pack on the area for 10 to 20 minutes at a time. Put a thin cloth between the ice and your skin.     · For most back pain you can take over-the-counter pain medicine. Nonsteroidal anti-inflammatory drugs (NSAIDs) such as ibuprofen or naproxen seem to work best. But if you can't take NSAIDs you can try acetaminophen. Your doctor can prescribe stronger medicines if needed. Be safe with medicines.  Read and follow all instructions on the label.   Body positions and posture    · Sit or lie in positions that are most comfortable for you and that reduce pain. Try one of these positions when you lie down:  ? Lie on your back with your knees bent and supported by large pillows. ? Lie on the floor with your legs on the seat of a sofa or chair. ? Lie on your side with your knees and hips bent and a pillow between your legs. ? Lie on your stomach if it does not make pain worse.     · Do not sit up in bed. Avoid soft couches and twisted positions.     · Avoid bed rest after the first day of back pain. Bed rest can help relieve pain at first, but it delays healing. Continued rest without activity is usually not good for your back.     · If you must sit for long periods of time, take breaks from sitting. Change positions every 30 minutes. Get up and walk around, or lie in a comfortable position. Activity    · Take short walks several times a day. You can start with 5 to 10 minutes, 3 or 4 times a day, and work up to longer walks. Walk on level surfaces and avoid hills and stairs until your back starts to feel better.     · After your back spasm starts to feel better, try to stretch your muscles every day, especially before and after exercise and at bedtime. Regular stretching can help relax your muscles.     · To prevent future back pain, do exercises to stretch and strengthen your back and stomach. Learn to use good posture, safe lifting techniques, and other ways to move to help you avoid back pain. When should you call for help? Call 911 anytime you think you may need emergency care. For example, call if:    · You are unable to move an arm or a leg at all. Call your doctor now or seek immediate medical care if:    · You have new or worse symptoms in your legs, belly, or buttocks. Symptoms may include:  ? Numbness or tingling. ? Weakness. ? Pain.     · You lose bladder or bowel control.    Watch closely for changes in your health, and be sure to contact your doctor if:    · You have a fever, lose weight, or don't feel well.     · You do not get better as expected. Where can you learn more? Go to https://FOCUS RESEARCH.VitalFields. org and sign in to your [employer]ComLink account. Enter E232 in the [employer] box to learn more about \"Back Spasm: Care Instructions. \"     If you do not have an account, please click on the \"Sign Up Now\" link. Current as of: March 2, 2020               Content Version: 12.6  © 5735-2417 [employer]. Care instructions adapted under license by [facility] ([facility]). If you have questions about a medical condition or this instruction, always ask your healthcare professional. [address] any warranty or liability for your use of this information. Patient Education        Neck Spasm: Exercises  Introduction  Here are some examples of exercises for you to try. The exercises may be suggested for a condition or for rehabilitation. Start each exercise slowly. Ease off the exercises if you start to have pain. You will be told when to start these exercises and which ones will work best for you. How to do the exercises  Levator scapula stretch   1. Sit in a firm chair, or stand up straight. 2. Gently tilt your head toward your left shoulder. 3. Turn your head to look down into your armpit, bending your head slightly forward. Let the weight of your head stretch your neck muscles. 4. Hold for 15 to 30 seconds. 5. Return to your starting position. 6. Follow the same instructions above, but tilt your head toward your right shoulder. 7. Repeat 2 to 4 times toward each shoulder. Upper trapezius stretch   1. Sit in a firm chair, or stand up straight. 2. This stretch works best if you keep your shoulder down as you lean away from it. To help you remember to do this, start by relaxing your shoulders and lightly holding on to your thighs or your chair.   3. Tilt your head toward your shoulder and hold for 15 to 30 seconds. Let the weight of your head stretch your muscles. 4. If you would like a little added stretch, place your arm behind your back. Use the arm opposite of the direction you are tilting your head. For example, if you are tilting your head to the left, place your right arm behind your back. 5. Repeat 2 to 4 times toward each shoulder. Neck rotation   1. Sit in a firm chair, or stand up straight. 2. Keeping your chin level, turn your head to the right, and hold for 15 to 30 seconds. 3. Turn your head to the left, and hold for 15 to 30 seconds. 4. Repeat 2 to 4 times to each side. Chin tuck   1. Lie on the floor with a rolled-up towel under your neck. Your head should be touching the floor. 2. Slowly bring your chin toward the front of your neck. 3. Hold for a count of 6, and then relax for up to 10 seconds. 4. Repeat 8 to 12 times. Forward neck flexion   1. Sit in a firm chair, or stand up straight. 2. Bend your head forward. 3. Hold for 15 to 30 seconds, then return to your starting position. 4. Repeat 2 to 4 times. Follow-up care is a key part of your treatment and safety. Be sure to make and go to all appointments, and call your doctor if you are having problems. It's also a good idea to know your test results and keep a list of the medicines you take. Where can you learn more? Go to https://MINDBODYpeSAW Instrument.TransEnterix. org and sign in to your [employer] account. Enter P962 in the [facility] box to learn more about \"Neck Spasm: Exercises. \"     If you do not have an account, please click on the \"Sign Up Now\" link. Current as of: March 2, 2020               Content Version: 12.6  © 5260-3729 [employer]. Care instructions adapted under license by [facility] "[employer] [facility] ([facility]).  If you have questions about a medical condition or this instruction, always ask your healthcare professional. [address]  any warranty or liability for your use of this information. Patient Education        Learning About Vitamin D  Why is it important to get enough vitamin D? Your body needs vitamin D to absorb calcium. Calcium keeps your bones and muscles, including your heart, healthy and strong. If your muscles don't get enough calcium, they can cramp, hurt, or feel weak. You may have long-term (chronic) muscle aches and pains. If you don't get enough vitamin D throughout life, you have an increased chance of having thin and brittle bones (osteoporosis) in your later years. Children who don't get enough vitamin D may not grow as much as others their age. They also have a chance of getting a rare disease called rickets. It causes weak bones. Vitamin D and calcium are added to many foods. And your body uses sunshine to make its own vitamin D. How much vitamin D do you need? The [place] of [employer] recommends that people ages 3 through 79 get 600 IU (international units) every day. Adults 71 and older need 800 IU every day. Blood tests for vitamin D can check your vitamin D level. But there is no standard normal range used by all laboratories. You're likely getting enough vitamin D if your levels are in the range of 20 to 50 ng/mL. How can you get more vitamin D? Foods that contain vitamin D include:  · [place], tuna, and mackerel. These are some of the best foods to eat when you need to get more vitamin D.  · Cheese, egg yolks, and beef liver. These foods have vitamin D in small amounts. · Milk, soy drinks, orange juice, yogurt, margarine, and some kinds of cereal have vitamin D added to them. Some people don't make vitamin D as well as others. They may have to take extra care in getting enough vitamin D. Things that reduce how much vitamin D your body makes include:  · Dark skin, such as many  Americans have. · Age, especially if you are older than 72. · Digestive problems, such as Crohn's or celiac disease.   · Liver and kidney disease. Some people who do not get enough vitamin D may need supplements. Are there any risks from taking vitamin D?  · Too much vitamin D:  ? Can damage your kidneys. ? Can cause nausea and vomiting, constipation, and weakness. ? Raises the amount of calcium in your blood. If this happens, you can get confused or have an irregular heart rhythm. · Vitamin D may interact with other medicines. Tell your doctor about all of the medicines you take, including over-the-counter drugs, herbs, and pills. Tell your doctor about all of your current medical problems. Where can you learn more? Go to https://Feverpepiceweb.Compound Time. org and sign in to your [employer] account. Enter [id number] in the [employer] box to learn more about \"Learning About Vitamin D. \"     If you do not have an account, please click on the \"Sign Up Now\" link. Current as of: August 22, 2019               Content Version: 12.6  © 3698-0422 [employer]. Care instructions adapted under license by [facility] ([facility]). If you have questions about a medical condition or this instruction, always ask your healthcare professional. [place] [address] any warranty or liability for your use of this information.

## 2021-02-10 DIAGNOSIS — F32.A ANXIETY AND DEPRESSION: ICD-10-CM

## 2021-02-10 DIAGNOSIS — F41.9 ANXIETY AND DEPRESSION: ICD-10-CM

## 2021-02-10 DIAGNOSIS — G89.29 CHRONIC NECK PAIN: ICD-10-CM

## 2021-02-10 DIAGNOSIS — M54.2 CHRONIC NECK PAIN: ICD-10-CM

## 2021-02-10 RX ORDER — MELATONIN
1000 DAILY
Qty: 30 TABLET | Refills: 5 | Status: SHIPPED | OUTPATIENT
Start: 2021-02-10 | End: 2022-03-16 | Stop reason: SDUPTHER

## 2021-02-10 RX ORDER — IBUPROFEN 800 MG/1
800 TABLET ORAL EVERY 8 HOURS PRN
Qty: 120 TABLET | Refills: 0 | Status: SHIPPED | OUTPATIENT
Start: 2021-02-10 | End: 2022-01-27 | Stop reason: SDUPTHER

## 2021-02-10 RX ORDER — PAROXETINE HYDROCHLORIDE 20 MG/1
20 TABLET, FILM COATED ORAL DAILY
Qty: 30 TABLET | Refills: 3 | Status: SHIPPED | OUTPATIENT
Start: 2021-02-10 | End: 2022-01-27

## 2021-02-10 NOTE — TELEPHONE ENCOUNTER
female     Elevated C-reactive protein (CRP)     Elevated erythrocyte sedimentation rate     Arthralgia

## 2021-02-17 ENCOUNTER — OFFICE VISIT (OUTPATIENT)
Dept: FAMILY MEDICINE CLINIC | Age: 36
End: 2021-02-17
Payer: COMMERCIAL

## 2021-02-17 VITALS
HEART RATE: 83 BPM | BODY MASS INDEX: 45.35 KG/M2 | DIASTOLIC BLOOD PRESSURE: 96 MMHG | TEMPERATURE: 96.8 F | SYSTOLIC BLOOD PRESSURE: 128 MMHG | HEIGHT: 60 IN | WEIGHT: 231 LBS

## 2021-02-17 DIAGNOSIS — E55.9 VITAMIN D DEFICIENCY: ICD-10-CM

## 2021-02-17 DIAGNOSIS — Z00.00 ROUTINE ADULT HEALTH MAINTENANCE: ICD-10-CM

## 2021-02-17 DIAGNOSIS — R70.0 ELEVATED ERYTHROCYTE SEDIMENTATION RATE: ICD-10-CM

## 2021-02-17 DIAGNOSIS — R79.82 ELEVATED C-REACTIVE PROTEIN (CRP): ICD-10-CM

## 2021-02-17 DIAGNOSIS — M62.838 MUSCLE SPASMS OF NECK: Primary | ICD-10-CM

## 2021-02-17 PROCEDURE — 99214 OFFICE O/P EST MOD 30 MIN: CPT | Performed by: STUDENT IN AN ORGANIZED HEALTH CARE EDUCATION/TRAINING PROGRAM

## 2021-02-17 RX ORDER — CYCLOBENZAPRINE HCL 5 MG
5 TABLET ORAL 2 TIMES DAILY PRN
Qty: 30 TABLET | Refills: 0 | Status: SHIPPED | OUTPATIENT
Start: 2021-02-17 | End: 2021-03-09

## 2021-02-17 ASSESSMENT — ENCOUNTER SYMPTOMS
SORE THROAT: 0
NAUSEA: 0
WHEEZING: 0
SHORTNESS OF BREATH: 0
CONSTIPATION: 0
CHEST TIGHTNESS: 0
BACK PAIN: 1
ABDOMINAL PAIN: 0
EYE DISCHARGE: 0
COUGH: 0
VOMITING: 0
DIARRHEA: 0

## 2021-02-17 NOTE — PROGRESS NOTES
[address] PRIMARY CARE  [address] [place] [id number]  Dept: [phone]  Dept Fax: [phone]    April Fuller is a 38 y.o. female who is a Established patient, who presents today for her medical conditions/complaints as noted below:  Chief Complaint   Patient presents with    Neck Pain    Back Pain         HPI:     She has not had much relief in her neck pain despite taking NSAIDs and muscle relaxants. Has been getting frequent headaches starting at the base of her neck and spreading all over her head. Can feel her neck very stiff and has pain with movement. Has been having difficulty sleeping at night due to pain. Denies any numbness or tingling in arms and hands. She had Covid in March 2020 and since then has been having frequent joint pains and allergy symptoms. Had elevated ESR and CRP and needs to follow-up with rheumatology.   Has been having frequent allergy symptoms and plans to follow-up with allergist.      Hemoglobin A1C (%)   Date Value   02/21/2020 5.7             ( goal A1Cis < 7)   No results found for: LABMICR  LDL Cholesterol (mg/dL)   Date Value   02/21/2020 136 (H)       (goal LDL is <100)   AST (U/L)   Date Value   01/05/2021 13     ALT (U/L)   Date Value   01/05/2021 10     BUN (mg/dL)   Date Value   01/05/2021 14     BP Readings from Last 3 Encounters:   02/17/21 (!) 128/96   02/04/21 126/84   12/14/20 126/74          (goal 120/80)    Past Medical History:   Diagnosis Date    Depression     Irregular periods       Past Surgical History:   Procedure Laterality Date    APPENDECTOMY      COLPOSCOPY  04/14/2020    [facility]- Pap Showed LGSIL + hpv- Bx taken ECC 11 and 1    KIDNEY SURGERY Bilateral        Family History   Problem Relation Age of Onset    Breast Cancer Maternal Cousin 55        2nd degree       Social History     Tobacco Use    Smoking status: Never Smoker    Smokeless tobacco: Never Used   Substance Use Topics    Alcohol use: Yes      Current Outpatient Medications   Medication Sig Dispense Refill    cyclobenzaprine (FLEXERIL) 5 MG tablet Take 1 tablet by mouth 2 times daily as needed for Muscle spasms 30 tablet 0    diclofenac sodium (VOLTAREN) 1 % GEL Apply topically 2 times daily 50 g 0    ibuprofen (ADVIL;MOTRIN) 800 MG tablet Take 1 tablet by mouth every 8 hours as needed for Pain 120 tablet 0    vitamin D3 (CHOLECALCIFEROL) 25 MCG (1000 UT) TABS tablet Take 1 tablet by mouth daily 30 tablet 5    PARoxetine (PAXIL) 20 MG tablet Take 1 tablet by mouth daily 30 tablet 3    EPINEPHrine (EPIPEN 2-ES) 0.3 MG/0.3ML SOAJ injection Inject 0.3 mLs into the skin once for 1 dose Use as directed for allergic reaction 0.3 mL 0     Current Facility-Administered Medications   Medication Dose Route Frequency Provider Last Rate Last Admin    levonorgestrel (MIRENA) IUD 52 mg 1 each  1 each Intrauterine Once Martin Zhang, DO   1 each at 03/09/20 0853     No Known Allergies    Health Maintenance   Topic Date Due    Hepatitis C screen  1985    A1C test (Diabetic or Prediabetic)  02/21/2021    DTaP/Tdap/Td vaccine (1 - Tdap) 12/14/2021 (Originally 12/5/2003)    Cervical cancer screen  02/25/2025    Flu vaccine  Completed    HIV screen  Completed    Hepatitis A vaccine  Aged Out    Hepatitis B vaccine  Aged Out    Hib vaccine  Aged Out    Meningococcal (ACWY) vaccine  Aged Out    Pneumococcal 0-64 years Vaccine  Aged Out    Varicella vaccine  Discontinued       Subjective:     Review of Systems   Constitutional: Negative for appetite change, fatigue and fever. HENT: Negative for congestion, ear pain, hearing loss and sore throat. Eyes: Negative for discharge and visual disturbance. Respiratory: Negative for cough, chest tightness, shortness of breath and wheezing. Cardiovascular: Negative for chest pain, palpitations and leg swelling.    Gastrointestinal: Negative for abdominal pain, constipation, diarrhea, nausea and vomiting. Genitourinary: Negative for flank pain, frequency, hematuria and urgency. Musculoskeletal: Positive for back pain and neck pain. Negative for arthralgias, gait problem, joint swelling and myalgias. Skin: Negative. Neurological: Positive for headaches. Negative for dizziness, weakness and numbness. Psychiatric/Behavioral: Negative. Negative for dysphoric mood. The patient is not nervous/anxious. Objective:     Physical Exam  Vitals signs reviewed. Constitutional:       Appearance: Normal appearance. She is normal weight. HENT:      Head: Normocephalic and atraumatic. Nose: Nose normal.      Mouth/Throat:      Mouth: Mucous membranes are moist.      Pharynx: Oropharynx is clear. Eyes:      Extraocular Movements: Extraocular movements intact. Conjunctiva/sclera: Conjunctivae normal.      Pupils: Pupils are equal, round, and reactive to light. Neck:      Musculoskeletal: Normal range of motion and neck supple. Cardiovascular:      Rate and Rhythm: Normal rate and regular rhythm. Heart sounds: Normal heart sounds. No murmur. No gallop. Pulmonary:      Effort: Pulmonary effort is normal. No respiratory distress. Breath sounds: Normal breath sounds. No stridor. No wheezing. Abdominal:      General: Bowel sounds are normal. There is no distension. Palpations: Abdomen is soft. Tenderness: There is no abdominal tenderness. There is no guarding or rebound. Musculoskeletal: Normal range of motion. General: No swelling or tenderness. Comments: Tenderness in the paraspinal muscles in both cervical and thoracic spine. Skin:     General: Skin is warm and dry. Coloration: Skin is not jaundiced. Findings: No rash. Neurological:      General: No focal deficit present. Mental Status: She is alert and oriented to person, place, and time.    Psychiatric:         Mood and Affect: Mood normal.         Behavior: Behavior normal. Thought Content: Thought content normal.         Judgment: Judgment normal.       BP (!) 128/96 (Site: Right Upper Arm, Position: Sitting, Cuff Size: Large Adult)   Pulse 83   Temp 96.8 °F (36 °C) (Temporal)   Ht 5' (1.524 m)   Wt 231 lb (104.8 kg)   BMI 45.11 kg/m²     Assessment/Plan:   1. Muscle spasms of neck  -     cyclobenzaprine (FLEXERIL) 5 MG tablet; Take 1 tablet by mouth 2 times daily as needed for Muscle spasms, Disp-30 tablet, R-0No[facility]ly [facility]  -     [facility] [place]  -     diclofenac sodium (VOLTAREN) 1 % GEL; Apply topically 2 times daily, Topical, 2 TIMES DAILY Starting Wed 2/17/2021, Disp-50 g, R-0, Normal  2. Vitamin D deficiency  -     Vitamin D 25 Hydroxy; Future  3. Routine adult health maintenance  -     TSH with Reflex; Future  -     Lipid Panel; Future  -     Hemoglobin A1C; Future  -     CBC Auto Differential; Future  -     Comprehensive Metabolic Panel; Future  -     Hepatitis C Antibody; Future  4. Elevated C-reactive protein (CRP)  -     External Referral To Rheumatology  5. Elevated erythrocyte sedimentation rate  -     External Referral To Rheumatology      Return in about 1 month (around 3/17/2021) for Follow up neck pain, back pain.     Orders Placed This Encounter   Procedures    TSH with Reflex     Standing Status:   Future     Standing Expiration Date:   5/17/2021    Vitamin D 25 Hydroxy     Standing Status:   Future     Standing Expiration Date:   5/17/2021    Lipid Panel     Standing Status:   Future     Standing Expiration Date:   5/17/2021     Order Specific Question:   Is Patient Fasting?/# of Hours     Answer:   yes    Hemoglobin A1C     Standing Status:   Future     Standing Expiration Date:   5/17/2021    CBC Auto Differential     Standing Status:   Future     Standing Expiration Date:   5/17/2021    Comprehensive Metabolic Panel     Standing Status:   Future     Standing Expiration Date:   5/17/2021    Hepatitis C Antibody     Standing Status:

## 2021-02-23 ENCOUNTER — HOSPITAL ENCOUNTER (OUTPATIENT)
Dept: PHYSICAL THERAPY | Facility: CLINIC | Age: 36
Setting detail: THERAPIES SERIES
Discharge: HOME | End: 2021-02-23
Payer: COMMERCIAL

## 2021-02-23 ENCOUNTER — TELEPHONE (OUTPATIENT)
Dept: FAMILY MEDICINE CLINIC | Age: 36
End: 2021-02-23

## 2021-02-23 PROCEDURE — 97140 MANUAL THERAPY 1/> REGIONS: CPT

## 2021-02-23 PROCEDURE — 97162 PT EVAL MOD COMPLEX 30 MIN: CPT
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
## 2021-02-25 ENCOUNTER — HOSPITAL ENCOUNTER (OUTPATIENT)
Dept: PHYSICAL THERAPY | Facility: CLINIC | Age: 36
Setting detail: THERAPIES SERIES
Discharge: HOME | End: 2021-02-25
Payer: COMMERCIAL

## 2021-02-25 PROCEDURE — 97110 THERAPEUTIC EXERCISES: CPT

## 2021-02-25 PROCEDURE — 97140 MANUAL THERAPY 1/> REGIONS: CPT

## 2021-02-25 NOTE — FLOWSHEET NOTE
[] [facility]) [facility] CENTER &  Therapy  [address]  [phone]  F: [phone] [] [address]  [employer] [address]   [address]  P: [phone]  F: [phone] [] [address]  [address]  P: [phone]  F: [phone] [] [address]  P: [phone]  F: [phone] [] [address]  [facility]   [address]   [place]: [phone]  F: [phone]      Physical Therapy Daily Treatment Note    Date:  2021  Patient Name:  Konrad Hackett    :  1985  MRN: [id number]  Physician: Brian Jimenez MD                         Insurance: [employer] (30 visits, no co-pay)  Medical Diagnosis: Muscle spasms of neck                          Rehab Codes: U50.256  Onset Date: 2021                      Next 's appt. : 3/23/2021  Visit# / total visits:     Cancels/No Shows: 0    Subjective:    Pain:  [x] Yes  [] No Location: cervical spine Pain ating: (0-10 scale) 7/10    Comments:  Patient reports getting significant relief from initial visit until this morning. Her head aches and neck pain was improved and her sleep was better. Objective:  Modalities: Prone HP 10 min   Precautions:  Exercises:  Exercise Reps/ Time Weight/ Level Comments HEP   Manual levator stretch 2x30\"      Manual Upper trap stretch 2x30\"      Self levator stretch 30\" ea   x   Scapula squeeze 10x5\"   x          Other:Other: Prone MFR to cervical spine and upper traps. Supine SOR, gentle manual traction. Dry Needling perfomed in conjunction with Manual therapy to promote tissue extensibility, improve ROM, and reduce pain. No charge submitted for the time the needle was inserted. Plan: [x] Continue current frequency toward long and short term goals.     [x] Specific Instructions for subsequent treatments: Continue with MT and Dry Needling, progress cervical ROM and strength      Time In:700            Time Out: 800    Electronically signed by:  Ayesha Burkett PT

## 2021-03-02 ENCOUNTER — HOSPITAL ENCOUNTER (OUTPATIENT)
Dept: PHYSICAL THERAPY | Facility: CLINIC | Age: 36
Setting detail: THERAPIES SERIES
Discharge: HOME | End: 2021-03-02
Payer: COMMERCIAL

## 2021-03-02 PROCEDURE — 97110 THERAPEUTIC EXERCISES: CPT

## 2021-03-02 PROCEDURE — 97140 MANUAL THERAPY 1/> REGIONS: CPT

## 2021-03-02 NOTE — FLOWSHEET NOTE
[] [facility]) [facility] CENTER &  Therapy  [address]  [phone]  F: [phone] [] [address]  [address]   [address]  P: [phone]  F: [phone] [] [place]  [address]  P: [phone]  F: [phone] [] [address]  P: [phone]  F: [phone] [] [address]  [facility]   [address]   [place]: [phone]  F: [phone]      Physical Therapy Daily Treatment Note    Date:  3/2/2021  Patient Name:  Junior Gibson    :  1985  MRN: [id number]  Physician: Javier Shen MD                         Insurance: [employer] (30 visits, no co-pay)  Medical Diagnosis: Muscle spasms of neck                          Rehab Codes: D43.100  Onset Date: 2021                      Next 's appt. : 3/23/2021  Visit# / total visits: 3/12    Cancels/No Shows: 0    Subjective:    Pain:  [x] Yes  [] No Location: cervical spine Pain ating: (0-10 scale) 3/10    Comments:  Patient reports getting significant relief from last visit. She still has pain and tightness but the frequency and intensity is greatly reduced. Objective:  Modalities: Prone HP 10 min   Precautions:  Exercises:  Exercise Reps/ Time Weight/ Level Comments HEP   Manual levator stretch 2x30\"      Manual Upper trap stretch 2x30\"      Self levator stretch 30\" ea   x   Scapula squeeze 10x5\"   x   Band row DA 10 Red SC     Band S/A row 10 ea Red SC     Ball W 10      UBE  2'/2'                    Other:Other: Prone MFR to cervical spine and upper traps. Supine SOR, gentle manual traction. Dry Needling perfomed in conjunction with Manual therapy to promote tissue extensibility, improve ROM, and reduce pain. No charge submitted for the time the needle was inserted. 1 left splenius trigger point treated with a 50mm needle using a bony backdrop for safety. Multiple twitch responses produced. 1 right splenius trigger point  treated with a 50mm needle using a bony backdrop for safety. Multiple twitch responses produced. 1 left C6 multifidus trigger point treated with a 50mm needle using a bony backdrop for safety. Multiple twitch responses produced. 1 right C6 multifidus trigger point treated with a 50mm needle using a bony backdrop for safety. Multiple twitch responses produced. 1 left upper trapezius trigger point treated with a 50mm needle using a thread technique for safety. Multiple twitch responses produced. 1 right upper trapezius trigger point treated with a 50mm needle using a thread technique for safety. Multiple twitch responses produced. Treatment Charges: Mins Units   [x]  Modalities: HP 10    [x]  Ther Exercise 15 1   [x]  Manual Therapy 30 2   []  Ther Activities     []  Aquatics     []  Vasocompression     [x]  Other:Dry Needling 5    Total Treatment time 60 3       Assessment: [x] Progressing toward goals. Improved neck pain, ROM and headaches vs. Initial visit        No change. [] Other:  [] Patient would continue to benefit from skilled physical therapy services in order to: meet original goals    STG/LTG:  STG: (to be met in 6 treatments)  1. ? Pain: 4/10  2. ? ROM: 100%  3. Patient to be independent with home exercise program as demonstrated by performance with correct form without cues. LTG: (to be met in 16 treatments)  1. NDI score will improve by >30%  2. Patient will read without limitation. 3. Patient will sleep without limitation.       Pt. Education:  [] Yes  [] No  [] Reviewed Prior HEP/Ed  Method of Education: [] Verbal  [] Demo  [] Written Comprehension of Education:  [] Verbalizes understanding. [] Demonstrates understanding. [] Needs review. [] Demonstrates/verbalizes HEP/Ed previously given. Plan: [x] Continue current frequency toward long and short term goals.     [x] Specific Instructions for subsequent treatments: Continue with MT and Dry Needling, progress cervical ROM and strength      Time In:700            Time Out: 800    Electronically signed by:  Yanci Guerra PT

## 2021-03-04 ENCOUNTER — HOSPITAL ENCOUNTER (OUTPATIENT)
Dept: PHYSICAL THERAPY | Facility: CLINIC | Age: 36
Setting detail: THERAPIES SERIES
Discharge: HOME | End: 2021-03-04
Payer: COMMERCIAL

## 2021-03-04 ENCOUNTER — TELEPHONE (OUTPATIENT)
Dept: FAMILY MEDICINE CLINIC | Age: 36
End: 2021-03-04

## 2021-03-04 PROCEDURE — G0283 ELEC STIM OTHER THAN WOUND: HCPCS

## 2021-03-04 PROCEDURE — 97140 MANUAL THERAPY 1/> REGIONS: CPT

## 2021-03-04 PROCEDURE — 97110 THERAPEUTIC EXERCISES: CPT

## 2021-03-04 NOTE — LETTER
[facility] Primary Care  [address] [place], [place] [address]  Phone: [phone]  Fax: [phone]    Tang Reese MD        March 4, 2021     Patient: Rafaela Oliveira   YOB: 1985   Date of Visit: 3/4/2021       To Whom it May Concern:    Please excuse my patient from work today She may return back tomorrow 3/5/2021. If you have any questions or concerns, please don't hesitate to call.     Sincerely,         Tang Reese MD

## 2021-03-04 NOTE — TELEPHONE ENCOUNTER
Patient went to physical therapy this am and she was having a lot of neck pain still and she didn't go to work she is wanting an off work note for today please advise

## 2021-03-04 NOTE — FLOWSHEET NOTE
[] [facility]) [facility] &  Therapy  [address]  [phone]  F: [phone] [] [address]  [facility] 36   [address]  P: [phone]  F: [phone] [] [address] &  Therapy  [address]  P: [phone]  F: [phone] [x] [address]  P: [phone]  F: [phone] [] [address]  [facility]   [address]   [place]: [phone]  F: [phone]      Physical Therapy Daily Treatment Note    Date:  3/4/2021  Patient Name:  Eryn Pagan    :  1985  MRN: [id number]  Physician: Sherry Narayan MD                         Insurance: [employer] (30 visits, no co-pay)  Medical Diagnosis: Muscle spasms of neck                          Rehab Codes: B19.574  Onset Date: 2021                      Next 's appt. : 3/23/2021  Visit# / total visits:     Cancels/No Shows: 0    Subjective:    Pain:  [x] Yes  [] No Location: cervical spine Pain rating: (0-10 scale) 8/10    Comments:  Patient reports getting significant relief from last visit. She still had pain and tightness yesterday but it is worse so far today. She woke up with increased tightness on the right. Objective:  Modalities: Prone HP 10 min pre, post 13 min IFC with ice in supine  Precautions:  Exercises:  Exercise Reps/ Time Weight/ Level Comments HEP   Manual levator stretch 2x30\"      Manual Upper trap stretch 2x30\"      Self levator stretch 30\" ea   x   Scapula squeeze 10x5\"   x   Band row DA  Red SC     Band S/A row  Red SC     Ball W       UBE  2'/2'                    Other:Other: Prone MFR to cervical spine and upper traps. Supine SOR, gentle manual traction. [x] Needs review. [] Demonstrates/verbalizes HEP/Ed previously given. Plan: [x] Continue current frequency toward long and short term goals.     [x] Specific Instructions for subsequent treatments: Continue with MT and Dry Needling, progress cervical ROM and strength      Time In:700            Time Out: 810    Electronically signed by:  Komal Carlos PT

## 2021-03-09 ENCOUNTER — HOSPITAL ENCOUNTER (OUTPATIENT)
Dept: PHYSICAL THERAPY | Facility: CLINIC | Age: 36
Setting detail: THERAPIES SERIES
Discharge: HOME | End: 2021-03-09
Payer: COMMERCIAL

## 2021-03-09 PROCEDURE — 97110 THERAPEUTIC EXERCISES: CPT

## 2021-03-09 PROCEDURE — 97140 MANUAL THERAPY 1/> REGIONS: CPT

## 2021-03-09 NOTE — FLOWSHEET NOTE
extensibility, improve ROM, and reduce pain. No charge submitted for the time the needle was inserted. 1 right splenius trigger point  treated with a 50mm needle using a bony backdrop for safety. Multiple twitch responses produced. 1 right C6 multifidus trigger points treated with a 50mm needle using a bony backdrop for safety. Multiple twitch responses produced. 1 right upper trapezius trigger point treated with a 50mm needle using a thread technique for safety. Multiple twitch responses produced. 1 left splenius trigger point  treated with a 50mm needle using a bony backdrop for safety. Multiple twitch responses produced. 1 left C6 multifidus trigger point treated with a 50mm needle using a bony backdrop for safety. Multiple twitch responses produced. 1 left upper trapezius trigger point treated with a 50mm needle using a thread technique for safety. Multiple twitch responses produced. Treatment Charges: Mins Units   [x]  Modalities: HP  8    [x]  Ther Exercise 25 1   [x]  Manual Therapy 20 2   []  Ther Activities     []  Aquatics     []  Vasocompression     [x]  Other:Dry Needling 5    Total Treatment time 58 3       Assessment: [x] Progressing toward goals. Began with heat followed by manual therapy described above. Dry Needling performed on the right side. Patient had much better tolerance to exercises today. She demonstrated full AROM following treatment with reduce reports of pain and tightness. No change. [] Other:  [] Patient would continue to benefit from skilled physical therapy services in order to: meet original goals    STG/LTG:  STG: (to be met in 6 treatments)  1. ? Pain: 4/10  2. ? ROM: 100%  3. Patient to be independent with home exercise program as demonstrated by performance with correct form without cues. LTG: (to be met in 16 treatments)  1. NDI score will improve by >30%  2. Patient will read without limitation.   3. Patient will sleep without limitation. Pt. Education:  [x] Yes  [] No  [] Reviewed Prior HEP/Ed  Method of Education: [x] Verbal  [] Demo  [] Written  Comprehension of Education:  [x] Verbalizes understanding. [] Demonstrates understanding. [x] Needs review. [] Demonstrates/verbalizes HEP/Ed previously given. Plan: [x] Continue current frequency toward long and short term goals.     [x] Specific Instructions for subsequent treatments: Continue with MT and Dry Needling, progress cervical ROM and strength      Time In:655            Time Out: 756    Electronically signed by:  Caitlyn Arce, PT

## 2021-03-11 ENCOUNTER — HOSPITAL ENCOUNTER (OUTPATIENT)
Dept: PHYSICAL THERAPY | Facility: CLINIC | Age: 36
Setting detail: THERAPIES SERIES
Discharge: HOME | End: 2021-03-11
Payer: COMMERCIAL

## 2021-03-11 PROCEDURE — 97140 MANUAL THERAPY 1/> REGIONS: CPT

## 2021-03-11 PROCEDURE — 97110 THERAPEUTIC EXERCISES: CPT

## 2021-03-11 NOTE — FLOWSHEET NOTE
[] [facility]) [facility] &  Therapy  [address]  [phone]  F: [phone] [] [address]  [facility]   [address]  P: [phone]  F: [phone] [] [address] &  Therapy  [address] [facility]  P: [phone]  F: [phone] [x] [address]  P: [phone]  F: [phone] [] [address]  [facility]   [address]   [place]: [phone]  F: [phone]      Physical Therapy Daily Treatment Note    Date:  3/11/2021  Patient Name:  Jarvis Rosenbaum    :  1985  MRN: [id number]  Physician: Ekaterina Waterman MD                         Insurance: [employer] (30 visits, no co-pay)  Medical Diagnosis: Muscle spasms of neck                          Rehab Codes: P79.834  Onset Date: 2021                      Next 's appt. : 3/23/2021  Visit# / total visits:     Cancels/No Shows: 0    Subjective:    Pain:  [x] Yes  [] No Location: cervical spine Pain rating: (0-10 scale) 2-310    Comments:  Patient reports being a little sore after last visit but feeling pretty good since. Objective:  Modalities: Prone HP 8 min pre  Precautions:  Exercises:  Exercise Reps/ Time Weight/ Level Comments HEP   Manual levator stretch 2x30\"      Manual Upper trap stretch 2x30\"      Self levator stretch    x   Scapula squeeze 10x5\"   x   Band row DA  Green SC     Band S/A row  [employer] T 10      Ball A 2x10      Ball W 2x10      UBE  3'/3'      Prone A 10x5\"      Roller self mob 2'      Roller pec stretch 10x      Chin tuck 10x5\"      Tuck and lift 10x5\"             Other:Other: Prone MFR to cervical spine and upper traps. Supine SOR, gentle manual traction.       Treatment Charges: Mins Units   [x]  Modalities: HP  8    [x]  Ther Exercise 25 2 [x]  Manual Therapy 13 1   []  Ther Activities     []  Aquatics     []  Vasocompression     [x]  Other:Dry Needling     Total Treatment time 46 3       Assessment: [x] Progressing toward goals. Began with heat followed by manual therapy described above. Cervical and thoracic stretching and strengtheing perfomed with improved exercise demonstration and improved tolerance. Patient is progressing as anticipated. No change. [] Other:  [] Patient would continue to benefit from skilled physical therapy services in order to: meet original goals    STG/LTG:  STG: (to be met in 6 treatments)  1. ? Pain: 4/10  2. ? ROM: 100%  3. Patient to be independent with home exercise program as demonstrated by performance with correct form without cues. LTG: (to be met in 16 treatments)  1. NDI score will improve by >30%  2. Patient will read without limitation. 3. Patient will sleep without limitation. Pt. Education:  [x] Yes  [] No  [] Reviewed Prior HEP/Ed  Method of Education: [x] Verbal  [] Demo  [] Written  Comprehension of Education:  [x] Verbalizes understanding. [] Demonstrates understanding. [x] Needs review. [] Demonstrates/verbalizes HEP/Ed previously given. Plan: [x] Continue current frequency toward long and short term goals.     [x] Specific Instructions for subsequent treatments: Continue with MT and Dry Needling, progress cervical ROM and strength      Time In:655            Time Out: 743    Electronically signed by:  Vince Arredondo PT

## 2021-03-16 ENCOUNTER — HOSPITAL ENCOUNTER (OUTPATIENT)
Dept: PHYSICAL THERAPY | Facility: CLINIC | Age: 36
Setting detail: THERAPIES SERIES
Discharge: HOME | End: 2021-03-16
Payer: COMMERCIAL

## 2021-03-16 PROCEDURE — 97140 MANUAL THERAPY 1/> REGIONS: CPT

## 2021-03-16 PROCEDURE — 97110 THERAPEUTIC EXERCISES: CPT

## 2021-03-16 NOTE — FLOWSHEET NOTE
[] [facility]) - [facility] &  Therapy  [address]  [phone]  F: [phone] [] [address]  [facility]   [address]  P: [phone]  F: [phone] [] [address] &  Therapy  [address] [facility]  P: [phone]  F: [phone] [x] [address]  P: [phone]  F: [phone] [] [address]  [facility]   [address]   [place]: [phone]  F: [phone]      Physical Therapy Daily Treatment Note    Date:  3/16/2021  Patient Name:  Buddy Echeverria    :  1985  MRN: [id number]  Physician: Eliazar Wang MD                         [facility] ([facility], no co-pay)  Medical Diagnosis: Muscle spasms of neck                          Rehab Codes: G99.048  Onset Date: 2021                      Next 's appt. : 3/23/2021  Visit# / total visits: /12    Cancels/No Shows: 0    Subjective:    Pain:  [x] Yes  [] No Location: cervical spine Pain rating: (0-10 scale) 2/10    Comments:  Patient reports getting relief from last visit. She continues to improve but has some remaining right sided pain and tightness. Objective:  Modalities: Prone HP 8 min pre  Precautions:  Exercises:  Exercise Reps/ Time Weight/ Level Comments HEP   Manual levator stretch 2x30\"      Manual Upper trap stretch 2x30\"      Self levator stretch    x   Scapula squeeze 10x5\"   x   Band row DA 2x15 Green SC     Band S/A row 2x10 Green SC     Band ER 2x15 lime     Ball T 10      Ball A 2x10      Ball W 2x10      UBE  3'/3'      Prone A 10x5\"      Roller self mob 2'      Roller pec stretch 10x      Chin tuck 10x5\"  On roller    Tuck and lift 10x5\"  On roller, some pain in cervical spine           Other:Other: Prone MFR to cervical spine and upper traps.   Supine SOR and manual traction. Treatment Charges: Mins Units   [x]  Modalities: HP  8    [x]  Ther Exercise 30 2   [x]  Manual Therapy 13 1   []  Ther Activities     []  Aquatics     []  Vasocompression     [x]  Other:Dry Needling 3    Total Treatment time 54 3       Assessment: [x] Progressing toward goals. Began with heat followed by manual therapy described above. Cervical and thoracic stretching and strengtheing perfomed with improved exercise demonstration and improved tolerance. Patient is making steady progress with pain, headaches and activity tolerance. No change. [] Other:  [] Patient would continue to benefit from skilled physical therapy services in order to: meet original goals    STG/LTG:  STG: (to be met in 6 treatments)  1. ? Pain: 4/10. Met 3/16  2. ? ROM: 100%. Met 3/16  3. Patient to be independent with home exercise program as demonstrated by performance with correct form without cues. LTG: (to be met in 16 treatments)  1. NDI score will improve by >30%  2. Patient will read without limitation. 3. Patient will sleep without limitation. Pt. Education:  [x] Yes  [] No  [] Reviewed Prior HEP/Ed  Method of Education: [x] Verbal  [] Demo  [] Written  Comprehension of Education:  [x] Verbalizes understanding. [] Demonstrates understanding. [x] Needs review. [] Demonstrates/verbalizes HEP/Ed previously given. Plan: [x] Continue current frequency toward long and short term goals.     [x] Specific Instructions for subsequent treatments: Continue with MT and Dry Needling, progress cervical ROM and strength      Time In:657            Time Out: 754    Electronically signed by:  Anahi Pereira, PT

## 2021-03-18 ENCOUNTER — HOSPITAL ENCOUNTER (OUTPATIENT)
Dept: PHYSICAL THERAPY | Facility: CLINIC | Age: 36
Setting detail: THERAPIES SERIES
Discharge: HOME | End: 2021-03-18
Payer: COMMERCIAL

## 2021-03-18 PROCEDURE — 97110 THERAPEUTIC EXERCISES: CPT

## 2021-03-18 PROCEDURE — 97140 MANUAL THERAPY 1/> REGIONS: CPT

## 2021-03-18 NOTE — FLOWSHEET NOTE
[] [facility]) - [facility] &  Therapy  [address]  [phone]  F: [phone] [] [address]  [employer] 36   [address]  P: [phone]  F: [phone] [] [address] &  Therapy  [address]  P: [phone]  F: [phone] [x] [address]  P: [phone]  F: [phone] [] [address]  [facility]   [address]   [place]: [phone]  F: [phone]      Physical Therapy Daily Treatment Note    Date:  3/18/2021  Patient Name:  Juan Manuel Acharya    :  1985  MRN: [id number]  Physician: Paris Whitman MD                         Insurance: [employer] (30 visits, no co-pay)  Medical Diagnosis: Muscle spasms of neck                          Rehab Codes: W53.983  Onset Date: 2021                      Next 's appt. : 3/23/2021  Visit# / total visits: 8/12    Cancels/No Shows: 0    Subjective:    Pain:  [x] Yes  [] No Location: cervical spine Pain rating: (0-10 scale) 2/10    Comments:  Patient reports being a little sore after last visit but the next day was really good with minimal pain. Objective:  Modalities: Prone HP 10 min pre  Precautions:  Exercises:  Exercise Reps/ Time Weight/ Level Comments HEP   Manual levator stretch 2x30\"      Manual Upper trap stretch 2x30\"      Self levator stretch    x   Scapula squeeze 10x5\"   x   Band row DA 2x15 Green SC     Band S/A row 2x10 Green SC     Band ER 2x15 lime     Ball T 2x10      Ball A 2x10      Ball W 2x10      UBE  3'/3'      Prone A       Roller self mob 2'      Roller pec stretch 10x      Chin tuck 10x5\"      Tuck and lift 10x5\"             Other:Other: Prone MFR to cervical spine and upper traps. Supine SOR and manual traction.     Dry Needling perfomed in conjunction with Manual therapy to promote tissue extensibility, improve ROM, and reduce pain. No charge submitted for the time the needle was inserted. 1 left splenius trigger point treated with a 25 mm needle using a bony backdrop for safety. Multiple twitch responses produced. 2 left upper trapezius trigger points treated with a 50 mm needle using a thread technique for safety. Multiple twitch responses produced. Treatment Charges: Mins Units   [x]  Modalities: HP  10    [x]  Ther Exercise 30 2   [x]  Manual Therapy 13 1   []  Ther Activities     []  Aquatics     []  Vasocompression     [x]  Other:Dry Needling 3    Total Treatment time 56 3       Assessment: [x] Progressing toward goals. Began with heat followed by manual therapy described above. Cervical and thoracic stretching and strengtheing perfomed with improved exercise demonstration and improved tolerance. Patient is making steady progress with pain, headaches and activity tolerance. No change. [] Other:  [] Patient would continue to benefit from skilled physical therapy services in order to: meet original goals    STG/LTG:  STG: (to be met in 6 treatments)  1. ? Pain: 4/10. Met 3/16  2. ? ROM: 100%. Met 3/16  3. Patient to be independent with home exercise program as demonstrated by performance with correct form without cues. LTG: (to be met in 16 treatments)  1. NDI score will improve by >30%  2. Patient will read without limitation. 3. Patient will sleep without limitation. Pt. Education:  [x] Yes  [] No  [] Reviewed Prior HEP/Ed  Method of Education: [x] Verbal  [] Demo  [] Written  Comprehension of Education:  [x] Verbalizes understanding. [] Demonstrates understanding. [x] Needs review. [] Demonstrates/verbalizes HEP/Ed previously given. Plan: [x] Continue current frequency toward long and short term goals.     [x] Specific Instructions for subsequent treatments: Continue with manual therapy to cervical spine.  Progress scapula stabilization with ball or prone alphabet, rows and ER exercises.       Time In:655            Time Out: 753    Electronically signed by:  Anahi Pereira PT

## 2021-03-23 ENCOUNTER — OFFICE VISIT (OUTPATIENT)
Dept: FAMILY MEDICINE CLINIC | Age: 36
End: 2021-03-23
Payer: COMMERCIAL

## 2021-03-23 VITALS
WEIGHT: 229 LBS | HEART RATE: 81 BPM | OXYGEN SATURATION: 99 % | BODY MASS INDEX: 44.96 KG/M2 | HEIGHT: 60 IN | DIASTOLIC BLOOD PRESSURE: 85 MMHG | TEMPERATURE: 97.3 F | SYSTOLIC BLOOD PRESSURE: 124 MMHG

## 2021-03-23 DIAGNOSIS — E55.9 VITAMIN D DEFICIENCY: ICD-10-CM

## 2021-03-23 DIAGNOSIS — G89.29 CHRONIC NECK PAIN: Primary | ICD-10-CM

## 2021-03-23 DIAGNOSIS — Z91.018 MULTIPLE FOOD ALLERGIES: ICD-10-CM

## 2021-03-23 DIAGNOSIS — E78.00 PURE HYPERCHOLESTEROLEMIA: ICD-10-CM

## 2021-03-23 DIAGNOSIS — M54.2 CHRONIC NECK PAIN: Primary | ICD-10-CM

## 2021-03-23 DIAGNOSIS — K59.00 CONSTIPATION, UNSPECIFIED CONSTIPATION TYPE: ICD-10-CM

## 2021-03-23 DIAGNOSIS — Z00.00 ROUTINE ADULT HEALTH MAINTENANCE: ICD-10-CM

## 2021-03-23 PROCEDURE — 99213 OFFICE O/P EST LOW 20 MIN: CPT | Performed by: STUDENT IN AN ORGANIZED HEALTH CARE EDUCATION/TRAINING PROGRAM

## 2021-03-23 RX ORDER — POLYETHYLENE GLYCOL 3350 17 G/17G
17 POWDER ORAL DAILY
Qty: 1 BOTTLE | Refills: 2 | Status: SHIPPED | OUTPATIENT
Start: 2021-03-23 | End: 2022-01-27

## 2021-03-23 ASSESSMENT — ENCOUNTER SYMPTOMS
NAUSEA: 0
VOMITING: 0
COUGH: 0
SHORTNESS OF BREATH: 0
SORE THROAT: 0
CONSTIPATION: 1
ABDOMINAL PAIN: 0
CHEST TIGHTNESS: 0
WHEEZING: 0
EYE DISCHARGE: 0
DIARRHEA: 0

## 2021-03-23 NOTE — PROGRESS NOTES
[address] PRIMARY CARE  [address] [place] [address] [address] [place] [address]  Dept: [phone]  Dept Fax: [phone]    Koby Michael is a 38 y.o. female who is a Established patient, who presents today for her medical conditions/complaints as noted below:  Chief Complaint   Patient presents with    Neck Pain    Back Pain         HPI:     She is here today to follow-up on her neck and back pain. She has noted improvement after going through physical therapy for past few weeks. She says she now has occasional pain in her right clavicle area. Still gets headaches though somewhat improved. Has been taking ibuprofen 800 mg as needed. She still has some Flexeril at home. She has vitamin D deficiency and has not yet started her supplement. She reports occasional constipation which is new to her. Denies any blood in stools. She has multiple food allergies and has never had formal testing done. Needs a referral to allergist.  Has history of elevated ESR and CRP. She has made an appointment with rheumatologist, Dr. Federico Prader.            Hemoglobin A1C (%)   Date Value   02/21/2020 5.7             ( goal A1Cis < 7)   No results found for: LABMICR  LDL Cholesterol (mg/dL)   Date Value   02/21/2020 136 (H)       (goal LDL is <100)   AST (U/L)   Date Value   01/05/2021 13     ALT (U/L)   Date Value   01/05/2021 10     BUN (mg/dL)   Date Value   01/05/2021 14     BP Readings from Last 3 Encounters:   03/23/21 124/85   02/17/21 (!) 128/96   02/04/21 126/84          (goal 120/80)    Past Medical History:   Diagnosis Date    Depression     Irregular periods     Sleep disturbance       Past Surgical History:   Procedure Laterality Date    APPENDECTOMY      COLPOSCOPY  04/14/2020    Dr. Navya Narayan- Pap Showed LGSIL + hpv- Bx taken ECC 11 and 1    KIDNEY SURGERY Bilateral        Family History   Problem Relation Age of Onset    Breast Cancer Maternal Cousin 55        2nd degree       Social History Tobacco Use    Smoking status: Never Smoker    Smokeless tobacco: Never Used   Substance Use Topics    Alcohol use: Yes      Current Outpatient Medications   Medication Sig Dispense Refill    polyethylene glycol (MIRALAX) 17 GM/SCOOP POWD powder Take 17 g by mouth daily 1 Bottle 2    diclofenac sodium (VOLTAREN) 1 % GEL Apply topically 2 times daily 50 g 0    ibuprofen (ADVIL;MOTRIN) 800 MG tablet Take 1 tablet by mouth every 8 hours as needed for Pain 120 tablet 0    PARoxetine (PAXIL) 20 MG tablet Take 1 tablet by mouth daily 30 tablet 3    vitamin D3 (CHOLECALCIFEROL) 25 MCG (1000 UT) TABS tablet Take 1 tablet by mouth daily 30 tablet 5    EPINEPHrine (EPIPEN 2-ES) 0.3 MG/0.3ML SOAJ injection Inject 0.3 mLs into the skin once for 1 dose Use as directed for allergic reaction 0.3 mL 0     Current Facility-Administered Medications   Medication Dose Route Frequency Provider Last Rate Last Admin    levonorgestrel (MIRENA) IUD 52 mg 1 each  1 each Intrauterine Once Skippy Sadia, DO   1 each at 03/09/20 0853     No Known Allergies    Health Maintenance   Topic Date Due    Hepatitis C screen  Never done    COVID-19 Vaccine (1) Never done    A1C test (Diabetic or Prediabetic)  02/21/2021    DTaP/Tdap/Td vaccine (1 - Tdap) 12/14/2021 (Originally 12/5/2003)    Cervical cancer screen  02/25/2025    Flu vaccine  Completed    HIV screen  Completed    Hepatitis A vaccine  Aged Out    Hepatitis B vaccine  Aged Out    Hib vaccine  Aged Out    Meningococcal (ACWY) vaccine  Aged Out    Pneumococcal 0-64 years Vaccine  Aged Out    Varicella vaccine  Discontinued       Subjective:     Review of Systems   Constitutional: Negative for appetite change, fatigue and fever. HENT: Negative for congestion, ear pain, hearing loss and sore throat. Eyes: Negative for discharge and visual disturbance. Respiratory: Negative for cough, chest tightness, shortness of breath and wheezing.     Cardiovascular: Negative for chest pain, palpitations and leg swelling. Gastrointestinal: Positive for constipation. Negative for abdominal pain, diarrhea, nausea and vomiting. Genitourinary: Negative for flank pain, frequency, hematuria and urgency. Musculoskeletal: Positive for neck stiffness. Negative for arthralgias, gait problem, joint swelling and myalgias. Skin: Negative. Neurological: Positive for headaches. Negative for dizziness, weakness and numbness. Psychiatric/Behavioral: Negative. Negative for dysphoric mood. The patient is not nervous/anxious. Objective:     Physical Exam  Vitals signs reviewed. Constitutional:       Appearance: Normal appearance. She is normal weight. HENT:      Head: Normocephalic and atraumatic. Nose: Nose normal.      Mouth/Throat:      Mouth: Mucous membranes are moist.      Pharynx: Oropharynx is clear. Eyes:      Extraocular Movements: Extraocular movements intact. Conjunctiva/sclera: Conjunctivae normal.      Pupils: Pupils are equal, round, and reactive to light. Neck:      Musculoskeletal: Normal range of motion and neck supple. Cardiovascular:      Rate and Rhythm: Normal rate and regular rhythm. Heart sounds: Normal heart sounds. No murmur. No gallop. Pulmonary:      Effort: Pulmonary effort is normal. No respiratory distress. Breath sounds: Normal breath sounds. No stridor. No wheezing. Abdominal:      General: Bowel sounds are normal. There is no distension. Palpations: Abdomen is soft. Tenderness: There is no abdominal tenderness. There is no guarding or rebound. Musculoskeletal: Normal range of motion. General: No swelling or tenderness. Skin:     General: Skin is warm and dry. Coloration: Skin is not jaundiced. Findings: No rash. Neurological:      General: No focal deficit present. Mental Status: She is alert and oriented to person, place, and time.    Psychiatric:         Mood and Affect: Mood GM/SCOOP POWD powder     Sig: Take 17 g by mouth daily     Dispense:  1 Bottle     Refill:  2       Patient given educational materials - see patient instructions. Discussed use, benefit, and side effects of prescribed medications. All patientquestions answered. Pt voiced understanding. Reviewed health maintenance. Instructedto continue current medications, diet and exercise. Patient agreed with treatmentplan. Follow up as directed.      Electronically signed by Jessica Michael MD on 3/23/2021 at 10:28 AM

## 2021-03-24 ENCOUNTER — HOSPITAL ENCOUNTER (OUTPATIENT)
Dept: PHYSICAL THERAPY | Facility: CLINIC | Age: 36
Setting detail: THERAPIES SERIES
Discharge: HOME | End: 2021-03-24
Payer: COMMERCIAL

## 2021-03-24 NOTE — FLOWSHEET NOTE
[] [address]  [address]    [phone]  F: [phone]   [] [address]  [facility] 36   [address]  P: [phone]  F: [phone]  [] [address]  [address] [facility]  P: [phone]  F: [phone] [x] [address]  P: [phone]  F: [phone]  [] [address]  [address] [facility] [address]   [address]   [place]: [phone]  F: [phone]   [] [facility]  [address] [facility] [address]  [place]: [phone]   F: [phone]     Physical Therapy Cancel/No Show note    Date: 3/24/2021  Patient: Esme Armstrong  : 1985  MRN: [id number]    Cancels/No Shows to date:     For today's appointment patient:    []  Cancelled    [] Rescheduled appointment    [x] No-show     Reason given by patient:    []  Patient ill    []  Conflicting appointment    [] No transportation      [] Conflict with work    [] No reason given    [] Weather related    [] COVID-19    [x] Other:      Comments: Pt reports she overslept and forgot to call. States she is able to make her next appt.           [x] Next appointment was confirmed    Electronically signed by: Juanpablo Del Rosario PTA

## 2021-03-26 ENCOUNTER — HOSPITAL ENCOUNTER (OUTPATIENT)
Dept: PHYSICAL THERAPY | Facility: CLINIC | Age: 36
Setting detail: THERAPIES SERIES
Discharge: HOME | End: 2021-03-26
Payer: COMMERCIAL

## 2021-03-26 PROCEDURE — 97140 MANUAL THERAPY 1/> REGIONS: CPT

## 2021-03-26 PROCEDURE — 97110 THERAPEUTIC EXERCISES: CPT

## 2021-03-26 NOTE — FLOWSHEET NOTE
[] [facility]) [facility] &  Therapy  [address]  [phone]  F: [phone] [] [address]   [address]  P: [phone]  F: [phone] [] [address] &  Therapy  [address] [facility]  P: [phone]  F: [phone] [x] [address]  P: [phone]  F: [phone] [] [address]  [facility]   [address]   [place]: [phone]  F: [phone]      Physical Therapy Daily Treatment Note    Date:  3/26/2021  Patient Name:  Vega Jarvis    :  1985  MRN: [id number]  Physician: Marquis Garcia MD                         Insurance: mktg (30 visits, no co-pay)  Medical Diagnosis: Muscle spasms of neck                          Rehab Codes: X66.604  Onset Date: 2021                      Next 's appt. : 3/23/2021  Visit# / total visits:     Cancels/No Shows: 0    Subjective:    Pain:  [x] Yes  [] No Location: cervical spine Pain rating: (0-10 scale) 6/10    Comments: Pt arrives soreness directly after last visit, relief the next day. Gradually came back and is moderately high upon arrival.     Objective:  Modalities: Prone HP 10 min pre  Precautions:  Exercises:  Exercise Reps/ Time Weight/ Level Comments HEP   Manual levator stretch 2x30\"      Manual Upper trap stretch 2x30\"      Self levator stretch    x   Scapula squeeze 10x5\"   x   Band row DA 2x15 Green SC     Band S/A row 2x10 Green SC     Band ER 2x15 lime     Ball T 2x10      Ball A 2x10      Ball W 2x10      UBE  3'/3'      Prone A       Roller self mob 2'      Roller pec stretch 10x      Chin tuck 10x5\"      Tuck and lift 10x5\"  Held d/t pain           Other:Other: Prone MFR to cervical spine and upper traps. Supine SOR and manual traction.     Dry Needling perfomed in conjunction with Manual therapy to promote tissue extensibility, improve ROM, and reduce pain. No charge submitted for the time the needle was inserted. 1 left splenius trigger point treated with a 25 mm needle using a bony backdrop for safety. Multiple twitch responses produced. 2 left upper trapezius trigger points treated with a 50 mm needle using a thread technique for safety. Multiple twitch responses produced. Treatment Charges: Mins Units   []  Modalities: HP      [x]  Ther Exercise 10 1   [x]  Manual Therapy 30 2   []  Ther Activities     []  Aquatics     []  Vasocompression     [x]  Other:Dry Needling 3    Total Treatment time 43 3       Assessment: [x] Progressing toward goals. MFR to begin treatment followed by neuro-re ed of jhonny post for purposes of length tension relationship correction. Pt reports relief post. Cont w/ chin tucks into pillow w/ lift, however modified w/ cervical spine in neutral not flexion d/t incr in pain. Ended treatment w/ IDN performed by supervising PT: Paralee Shine to above musculature. No change. [] Other:  [] Patient would continue to benefit from skilled physical therapy services in order to: meet original goals    STG/LTG:  STG: (to be met in 6 treatments)  1. ? Pain: 4/10. Met 3/16  2. ? ROM: 100%. Met 3/16  3. Patient to be independent with home exercise program as demonstrated by performance with correct form without cues. LTG: (to be met in 16 treatments)  1. NDI score will improve by >30%  2. Patient will read without limitation. 3. Patient will sleep without limitation. Pt. Education:  [x] Yes  [] No  [] Reviewed Prior HEP/Ed  Method of Education: [x] Verbal  [] Demo  [] Written  Comprehension of Education:  [x] Verbalizes understanding. [] Demonstrates understanding. [x] Needs review. [] Demonstrates/verbalizes HEP/Ed previously given.      Plan: [x] Continue current frequency toward long and short term goals. [x] Specific Instructions for subsequent treatments: Continue with manual therapy to cervical spine. Progress scapula stabilization with ball or prone alphabet, rows and ER exercises.       Time In: 1400            Time Out: 1550    Electronically signed by:  Matt Peralta PTA

## 2021-03-30 ENCOUNTER — HOSPITAL ENCOUNTER (OUTPATIENT)
Dept: PHYSICAL THERAPY | Facility: CLINIC | Age: 36
Setting detail: THERAPIES SERIES
Discharge: HOME | End: 2021-03-30
Payer: COMMERCIAL

## 2021-03-30 NOTE — DISCHARGE SUMMARY
[] [facility]) [facility] &  Therapy  [address]  [phone]  F: [phone] [] [address]  [facility]   [address]  P: [phone]  F: [phone] [] [address] &  Therapy  [address] [facility]  P: [phone]  F: [phone] [] [address]  P: [phone]  F: [phone] [] [address]  [facility]   [address]   [place]: [phone]  F: [phone]      Physical Therapy Discharge Note    Date: 3/30/2021      Patient: Tawana Leyden  : 1985  MRN: [id number]    Physician: Isaiah Dutton MD                         Insurance: Elpidio Lombard (30 visits, no co-pay)  Medical Diagnosis: Muscle spasms of neck                          Rehab Codes: V13.982  Onset Date: 2021                      Next 's appt. : 3/23/2021  Visit# / total visits: 8/                      Cancels/No Shows: 0    Date of initial visit: 2021                Date of final visit: 3/26/2021      Subjective:  Pain:  [x] Yes  [x] No  Location: N/A Pain Rating: (0-10 scale) 2/10    Comments:  Patient called to cancel remaining visits reporting that her neck was a lot better and she didn't need PT anymore    Objective:  Test Measurements:  On 3/26/21 Patient displayed full pain free AROM       Assessment:  STG:  LTG:STG/LTG:  STG: (to be met in 6 treatments)  1. ? Pain: 4/10. Met 3/16  2. ? ROM: 100%. Met 3/16  3. Patient to be independent with home exercise program as demonstrated by performance with correct form without cues. LTG: (to be met in 16 treatments)  1. NDI score will improve by >30%. Not issued due to passive D/C  2. Patient will read without limitation. Met  3. Patient will sleep without limitation.  Met           Treatment to Date:  [x] Therapeutic Exercise    [] Modalities:  [] Therapeutic Activity    [] Ultrasound  [] Electrical Stimulation  [] Gait Training     [] Massage       [] Lumbar/Cervical Traction  [x] Neuromuscular Re-education [x] Cold/hotpack [] Iontophoresis: 4 mg/mL  [] Instruction in Home Exercise Program                     Dexamethasone Sodium  [x] Manual Therapy             Phosphate 40-80 mAmin  [] Aquatic Therapy                   [] Vasocompression/    [] Other: Dry Needling           Game Ready    Discharge Status:     [x] Pt recovered from conditions. Treatment goals were met. [] Pt received maximum benefit. No further therapy indicated at this time. [] Pt to continue exercise/home instructions independently. [] Therapy interrupted due to:    [] Pt has 2 or more no shows/cancels, is discontinued per our policy. [] Pt has completed prescribed number of treatment sessions. [] Other:         Electronically signed by Fariba Coy PT on 3/30/2021 at 8:25 AM      If you have any questions or concerns, please don't hesitate to call.   Thank you for your referral.

## 2021-04-27 ENCOUNTER — TELEPHONE (OUTPATIENT)
Dept: FAMILY MEDICINE CLINIC | Age: 36
End: 2021-04-27

## 2021-04-27 NOTE — TELEPHONE ENCOUNTER
[facility] ([facility]) ED Follow up Call    Reason for ED visit:  ujness     4/27/2021     Rajendra Ribeiro , this is Keeley  from Dr. Peyton Case's office, just calling to see how you are doing after your recent ED visit. FU appts/Provider:    Future Appointments   Date Time Provider Canelo Rosa   6/22/2021 11:30 AM MD Anushka Truong  [email or web address]         VOICEMAIL DOCUMENTATION - ERASE IF NOT USED  Hi, this message is for Gema. This is Keeley Marika from [facility]. Just calling to see how you are doing after your recent visit to the Emergency Room. Edil Ceja wants to make sure you were able to fill any prescriptions and that you understand your discharge instructions. Please return our call if you need to make a follow up appointment with your provider or have any further needs. Our phone number is [phone]. Have a great day.

## 2021-05-07 ENCOUNTER — OFFICE VISIT (OUTPATIENT)
Dept: FAMILY MEDICINE CLINIC | Age: 36
End: 2021-05-07
Payer: COMMERCIAL

## 2021-05-07 VITALS
DIASTOLIC BLOOD PRESSURE: 88 MMHG | TEMPERATURE: 97.1 F | SYSTOLIC BLOOD PRESSURE: 126 MMHG | BODY MASS INDEX: 43.98 KG/M2 | HEIGHT: 60 IN | WEIGHT: 224 LBS

## 2021-05-07 DIAGNOSIS — G43.909 MIGRAINE WITHOUT STATUS MIGRAINOSUS, NOT INTRACTABLE, UNSPECIFIED MIGRAINE TYPE: ICD-10-CM

## 2021-05-07 DIAGNOSIS — R53.83 FATIGUE, UNSPECIFIED TYPE: ICD-10-CM

## 2021-05-07 DIAGNOSIS — E78.00 HYPERCHOLESTEROLEMIA: ICD-10-CM

## 2021-05-07 DIAGNOSIS — F41.8 MIXED ANXIETY AND DEPRESSIVE DISORDER: ICD-10-CM

## 2021-05-07 DIAGNOSIS — H81.13 BENIGN PAROXYSMAL POSITIONAL VERTIGO DUE TO BILATERAL VESTIBULAR DISORDER: Primary | ICD-10-CM

## 2021-05-07 DIAGNOSIS — Z86.39 HX OF THYROID CYST: ICD-10-CM

## 2021-05-07 PROCEDURE — 99214 OFFICE O/P EST MOD 30 MIN: CPT | Performed by: STUDENT IN AN ORGANIZED HEALTH CARE EDUCATION/TRAINING PROGRAM

## 2021-05-07 RX ORDER — MECLIZINE HYDROCHLORIDE 25 MG/1
25 TABLET ORAL 3 TIMES DAILY PRN
Qty: 30 TABLET | Refills: 0 | Status: SHIPPED | OUTPATIENT
Start: 2021-05-07 | End: 2021-05-17

## 2021-05-07 RX ORDER — MECLIZINE HYDROCHLORIDE 25 MG/1
TABLET ORAL
COMMUNITY
Start: 2021-04-27 | End: 2021-05-07 | Stop reason: ALTCHOICE

## 2021-05-07 RX ORDER — TOPIRAMATE 25 MG/1
25 TABLET ORAL 2 TIMES DAILY
Qty: 60 TABLET | Refills: 5 | Status: SHIPPED | OUTPATIENT
Start: 2021-05-07 | End: 2022-01-27

## 2021-05-07 ASSESSMENT — ENCOUNTER SYMPTOMS
VOMITING: 0
NAUSEA: 0
WHEEZING: 0
DIARRHEA: 0
SHORTNESS OF BREATH: 0
CONSTIPATION: 0
ABDOMINAL PAIN: 0
SORE THROAT: 0
CHEST TIGHTNESS: 0
COUGH: 0
EYE DISCHARGE: 0

## 2021-05-07 NOTE — PROGRESS NOTES
[address] PRIMARY CARE  [address] [place] [address] [address] [place] [address]  Dept: [phone]  Dept Fax: [phone]    Lisset Garcia is a 38 y.o. female who is a Established patient, who presents today for her medical conditions/complaints as noted below:  Chief Complaint   Patient presents with    Follow-Up from Hospital     dizziness off and on    Other     went to urgent care dx with ear infection but only gave medication for dizziness         HPI:     She is here today for ER follow-up visit. Criselda Nievesier that she started feeling dizzy last Saturday and it started getting worse to the point that she tripped and fell once. She went to the urgent care on 4/27 and was told that she might have an ear infection. She was then sent over to the ER at [facility].  All her work-up in the ER including CT brain was negative. She was sent home on meclizine which she has been taking. She said that the meclizine is helping her dizziness. She has had 1 fall since then but mostly tries to be careful. Says that her dizziness is worse with head movement. Denies any weakness, numbness or tingling in any part of the body. She denies any ear fullness, nasal congestion or ringing in ears. She also complains of daily headaches that are throbbing in nature and associated with photophobia. She says that her anxiety was previously improving on Paxil but now she feels that it is getting worse and she feels more depressed. Also notes that the change in mood could be secondary to the daily headaches that she has to deal with. She also complains of occasional pain in the left side of her throat. She has a history of thyroid cyst on the right side.       Hemoglobin A1C (%)   Date Value   02/21/2020 5.7             ( goal A1Cis < 7)   No results found for: LABMICR  LDL Cholesterol (mg/dL)   Date Value   02/21/2020 136 (H)       (goal LDL is <100)   AST (U/L)   Date Value   01/05/2021 13     ALT (U/L)   Date Value   01/05/2021 10     BUN (mg/dL)   Date Value   01/05/2021 14     BP Readings from Last 3 Encounters:   05/07/21 126/88   03/23/21 124/85   02/17/21 (!) 128/96          (goal 120/80)    Past Medical History:   Diagnosis Date    Depression     Irregular periods     Sleep disturbance       Past Surgical History:   Procedure Laterality Date    APPENDECTOMY      COLPOSCOPY  04/14/2020    Dr. Nilam Merrill- Pap Showed LGSIL + hpv- Bx taken ECC 11 and 1    KIDNEY SURGERY Bilateral        Family History   Problem Relation Age of Onset    Breast Cancer Maternal Cousin 55        2nd degree       Social History     Tobacco Use    Smoking status: Never Smoker    Smokeless tobacco: Never Used   Substance Use Topics    Alcohol use: Yes      Current Outpatient Medications   Medication Sig Dispense Refill    topiramate (TOPAMAX) 25 MG tablet Take 1 tablet by mouth 2 times daily 60 tablet 5    meclizine (ANTIVERT) 25 MG tablet Take 1 tablet by mouth 3 times daily as needed for Dizziness 30 tablet 0    polyethylene glycol (MIRALAX) 17 GM/SCOOP POWD powder Take 17 g by mouth daily 1 Bottle 2    ibuprofen (ADVIL;MOTRIN) 800 MG tablet Take 1 tablet by mouth every 8 hours as needed for Pain 120 tablet 0    vitamin D3 (CHOLECALCIFEROL) 25 MCG (1000 UT) TABS tablet Take 1 tablet by mouth daily 30 tablet 5    PARoxetine (PAXIL) 20 MG tablet Take 1 tablet by mouth daily 30 tablet 3    EPINEPHrine (EPIPEN 2-ES) 0.3 MG/0.3ML SOAJ injection Inject 0.3 mLs into the skin once for 1 dose Use as directed for allergic reaction 0.3 mL 0    diclofenac sodium (VOLTAREN) 1 % GEL Apply topically 2 times daily (Patient not taking: Reported on 5/7/2021) 50 g 0     Current Facility-Administered Medications   Medication Dose Route Frequency Provider Last Rate Last Admin    levonorgestrel (MIRENA) IUD 52 mg 1 each  1 each Intrauterine Once Offerman Yadira, DO   1 each at 03/09/20 0853     No Known Allergies    Health Maintenance   Topic Date Due  Hepatitis C screen  Never done    A1C test (Diabetic or Prediabetic)  02/21/2021    COVID-19 Vaccine (2 - Pfizer 2-dose series) 05/08/2021    DTaP/Tdap/Td vaccine (1 - Tdap) 12/14/2021 (Originally 12/5/2003)    Cervical cancer screen  02/25/2025    Flu vaccine  Completed    HIV screen  Completed    Hepatitis A vaccine  Aged Out    Hepatitis B vaccine  Aged Out    Hib vaccine  Aged Out    Meningococcal (ACWY) vaccine  Aged Out    Pneumococcal 0-64 years Vaccine  Aged Out    Varicella vaccine  Discontinued       Subjective:     Review of Systems   Constitutional: Negative for appetite change, fatigue and fever. HENT: Negative for congestion, ear pain, hearing loss and sore throat. Eyes: Negative for discharge and visual disturbance. Respiratory: Negative for cough, chest tightness, shortness of breath and wheezing. Cardiovascular: Negative for chest pain, palpitations and leg swelling. Gastrointestinal: Negative for abdominal pain, constipation, diarrhea, nausea and vomiting. Genitourinary: Negative for flank pain, frequency, hematuria and urgency. Musculoskeletal: Negative for arthralgias, gait problem, joint swelling and myalgias. Skin: Negative. Neurological: Positive for dizziness and headaches. Negative for weakness and numbness. Psychiatric/Behavioral: Positive for dysphoric mood. The patient is nervous/anxious. Objective:     Physical Exam  Vitals signs reviewed. Constitutional:       Appearance: Normal appearance. She is normal weight. HENT:      Head: Normocephalic and atraumatic. Right Ear: Tympanic membrane normal.      Left Ear: Tympanic membrane normal.      Nose: Nose normal.      Mouth/Throat:      Mouth: Mucous membranes are moist.      Pharynx: Oropharynx is clear. Comments: Thyroid nodule palpated on right side, non tender, mobile  Eyes:      Extraocular Movements: Extraocular movements intact.       Conjunctiva/sclera: Conjunctivae normal. Pupils: Pupils are equal, round, and reactive to light. Neck:      Musculoskeletal: Normal range of motion and neck supple. Cardiovascular:      Rate and Rhythm: Normal rate and regular rhythm. Heart sounds: Normal heart sounds. No murmur. No gallop. Pulmonary:      Effort: Pulmonary effort is normal. No respiratory distress. Breath sounds: Normal breath sounds. No stridor. No wheezing. Abdominal:      General: Bowel sounds are normal. There is no distension. Palpations: Abdomen is soft. Tenderness: There is no abdominal tenderness. There is no guarding or rebound. Musculoskeletal: Normal range of motion. General: No swelling or tenderness. Skin:     General: Skin is warm and dry. Coloration: Skin is not jaundiced. Findings: No rash. Neurological:      General: No focal deficit present. Mental Status: She is alert and oriented to person, place, and time. Cranial Nerves: Cranial nerves are intact. Sensory: Sensation is intact. Motor: Motor function is intact. Coordination: Coordination is intact. Romberg sign negative. Coordination normal. Finger-Nose-Finger Test and Heel to [facility] Test normal.      Gait: Gait is intact. Psychiatric:         Mood and Affect: Mood normal.         Behavior: Behavior normal.         Thought Content: Thought content normal.         Judgment: Judgment normal.       /88   Temp 97.1 °F (36.2 °C) (Temporal)   Ht 5' (1.524 m)   Wt 224 lb (101.6 kg)   BMI 43.75 kg/m²     Assessment/Plan:   1. Benign paroxysmal positional vertigo due to bilateral vestibular disorder  -     meclizine (ANTIVERT) 25 MG tablet; Take 1 tablet by mouth 3 times daily as needed for Dizziness, Disp-30 tablet, R-0Normal  2. Migraine without status migrainosus, not intractable, unspecified migraine type  -     topiramate (TOPAMAX) 25 MG tablet; Take 1 tablet by mouth 2 times daily, Disp-60 tablet, R-5Normal  3.  Hx of thyroid cyst  -     US HEAD NECK SOFT TISSUE THYROID; Future  4. Mixed anxiety and depressive disorder  5. Fatigue, unspecified type  -     Hemoglobin A1C; Future  -     TSH with Reflex; Future  -     Vitamin D 25 Hydroxy; Future  -     Vitamin B12 & Folate; Future  6. Hypercholesterolemia  -     Lipid Panel; Future    BPPV-doing better on meclizine. Refills provided. Advised to call back if symptoms worsen. Chronic migraines-gets daily migrainous headaches. Started on Topamax 25 mg twice daily. Anxiety and depression-not well controlled. On Paxil 40 mg daily. Discussed that the worsening mood could be secondary to daily headaches, will try to control headaches first and then consider increasing Paxil if needed. History of thyroid cyst-gets occasional pain in her throat. Palpable nodule felt on exam today. Ultrasound and thyroid functions ordered. Return in about 1 month (around 6/7/2021) for Follow up anxiety/depression. Orders Placed This Encounter   Procedures    US HEAD NECK SOFT TISSUE THYROID     This procedure can be scheduled via [employer]. Access your [employer] account by visiting [email or web address]. Standing Status:   Future     Standing Expiration Date:   5/7/2022     Order Specific Question:   Reason for exam:     Answer:   Nodule    Hemoglobin A1C     Standing Status:   Future     Standing Expiration Date:   8/7/2021    Lipid Panel     Standing Status:   Future     Standing Expiration Date:   8/7/2021     Order Specific Question:   Is Patient Fasting?/# of Hours     Answer:    Yes    TSH with Reflex     Standing Status:   Future     Standing Expiration Date:   8/7/2021    Vitamin D 25 Hydroxy     Standing Status:   Future     Standing Expiration Date:   8/7/2021    Vitamin B12 & Folate     Standing Status:   Future     Standing Expiration Date:   5/7/2022     Orders Placed This Encounter   Medications    topiramate (TOPAMAX) 25 MG tablet     Sig: Take 1 tablet by mouth 2 times daily Dispense:  60 tablet     Refill:  5    meclizine (ANTIVERT) 25 MG tablet     Sig: Take 1 tablet by mouth 3 times daily as needed for Dizziness     Dispense:  30 tablet     Refill:  0       Patient given educational materials - see patient instructions. Discussed use, benefit, and side effects of prescribed medications. All patientquestions answered. Pt voiced understanding. Reviewed health maintenance. Instructedto continue current medications, diet and exercise. Patient agreed with treatmentplan. Follow up as directed.      Electronically signed by Cathie Colon MD on 5/7/2021 at 1:35 PM No

## 2021-05-25 ENCOUNTER — HOSPITAL ENCOUNTER (OUTPATIENT)
Dept: ULTRASOUND IMAGING | Facility: CLINIC | Age: 36
Discharge: HOME | End: 2021-05-27
Payer: COMMERCIAL

## 2021-05-25 DIAGNOSIS — Z86.39 HX OF THYROID CYST: ICD-10-CM

## 2021-05-25 PROCEDURE — 76536 US EXAM OF HEAD AND NECK: CPT

## 2021-08-18 RX ORDER — METRONIDAZOLE 500 MG/1
500 TABLET ORAL 2 TIMES DAILY
Qty: 14 TABLET | Refills: 0 | Status: SHIPPED | OUTPATIENT
Start: 2021-08-18 | End: 2021-08-25

## 2021-10-15 ENCOUNTER — TELEPHONE (OUTPATIENT)
Dept: FAMILY MEDICINE CLINIC | Age: 36
End: 2021-10-15

## 2021-10-15 NOTE — TELEPHONE ENCOUNTER
Patient called to request a note to be off work today as she sprained her ankle yesterday and it is still painful and swollen. Patient was seen at the [address] [place] [address] Urgent Care in [facility] yesterday and was given a note to be off work that day. The provider that the patient saw yesterday is not in the office today and no other provider at the urgent care will write another letter for her. Please advise, thank you!

## 2021-10-15 NOTE — TELEPHONE ENCOUNTER
Please put in a letter for her for today. Please also remind her to call back and schedule appointment if her pain does not improve.

## 2021-11-17 ENCOUNTER — OFFICE VISIT (OUTPATIENT)
Dept: OBGYN CLINIC | Age: 36
End: 2021-11-17
Payer: COMMERCIAL

## 2021-11-17 VITALS
BODY MASS INDEX: 42.26 KG/M2 | WEIGHT: 216.38 LBS | RESPIRATION RATE: 16 BRPM | SYSTOLIC BLOOD PRESSURE: 116 MMHG | DIASTOLIC BLOOD PRESSURE: 74 MMHG

## 2021-11-17 DIAGNOSIS — Z30.432 ENCOUNTER FOR IUD REMOVAL: Primary | ICD-10-CM

## 2021-11-17 PROCEDURE — G8417 CALC BMI ABV UP PARAM F/U: HCPCS | Performed by: OBSTETRICS & GYNECOLOGY

## 2021-11-17 PROCEDURE — 58301 REMOVE INTRAUTERINE DEVICE: CPT | Performed by: OBSTETRICS & GYNECOLOGY

## 2021-11-17 PROCEDURE — G8484 FLU IMMUNIZE NO ADMIN: HCPCS | Performed by: OBSTETRICS & GYNECOLOGY

## 2021-11-17 PROCEDURE — G8428 CUR MEDS NOT DOCUMENT: HCPCS | Performed by: OBSTETRICS & GYNECOLOGY

## 2021-11-17 PROCEDURE — 1036F TOBACCO NON-USER: CPT | Performed by: OBSTETRICS & GYNECOLOGY

## 2021-11-17 NOTE — PROGRESS NOTES
Bhaskar Hoskins  2021    YOB: 1985      HPI:  Bhaskar Hoskins is a 38 y.o. female [id number]     Patient contemplating another baby within the next year and would like IUD removed today. OB History    Para Term  AB Living   6 4 4 0 2 4   SAB IAB Ectopic Molar Multiple Live Births   2 0 0 0 0 4      # Outcome Date GA Lbr Sriram/2nd Weight Sex Delivery Anes PTL Lv   6 Term 13 40w0d  9 lb 10 oz (4.366 kg) M Vag-Spont   PATRICIA   5 Term 02/15/11 40w0d  7 lb 8 oz (3.402 kg) M Vag-Spont   PATRICIA   4 Term 09 40w0d  7 lb 6 oz (3.345 kg) M Vag-Spont   PATRICIA   3 Term 2006 40w0d  7 lb (3.175 kg) M Vag-Spont   PATRICIA   2 SAB      SAB      1 SAB      SAB          Past Medical History:   Diagnosis Date    Depression     Irregular periods     Sleep disturbance        Past Surgical History:   Procedure Laterality Date    APPENDECTOMY      COLPOSCOPY  2020    Dr. Kanchan Corona- Pap Showed LGSIL + hpv- Bx taken ECC 11 and 1    KIDNEY SURGERY Bilateral        Family History   Problem Relation Age of Onset    Breast Cancer Maternal Cousin 55        2nd degree       Social History     Socioeconomic History    Marital status: Legally      Spouse name: Not on file    Number of children: Not on file    Years of education: Not on file    Highest education level: Not on file   Occupational History    Not on file   Tobacco Use    Smoking status: Never Smoker    Smokeless tobacco: Never Used   Vaping Use    Vaping Use: Never used   Substance and Sexual Activity    Alcohol use:  Yes    Drug use: No    Sexual activity: Yes     Partners: Male   Other Topics Concern    Not on file   Social History Narrative    Not on file     Social Determinants of Health     Financial Resource Strain:     Difficulty of Paying Living Expenses: Not on file   Food Insecurity:     Worried About Running Out of Food in the Last Year: Not on file    Juan of Food in the Last Year: Not on file Transportation Needs:     Lack of Transportation (Medical): Not on file    Lack of Transportation (Non-Medical):  Not on file   Physical Activity:     Days of Exercise per Week: Not on file    Minutes of Exercise per Session: Not on file   Stress:     Feeling of Stress : Not on file   Social Connections:     Frequency of Communication with Friends and Family: Not on file    Frequency of Social Gatherings with Friends and Family: Not on file    Attends Jainism Services: Not on file    Active Member of [address] [place] [address] or Organizations: Not on file    Attends Club or Organization Meetings: Not on file    Marital Status: Not on file   Intimate Partner Violence:     Fear of Current or Ex-Partner: Not on file    Emotionally Abused: Not on file    Physically Abused: Not on file    Sexually Abused: Not on file   Housing Stability:     Unable to Pay for Housing in the Last Year: Not on file    Number of [place] in the Last Year: Not on file    Unstable Housing in the Last Year: Not on file         MEDICATIONS:  Current Outpatient Medications   Medication Sig Dispense Refill    topiramate (TOPAMAX) 25 MG tablet Take 1 tablet by mouth 2 times daily 60 tablet 5    polyethylene glycol (MIRALAX) 17 GM/SCOOP POWD powder Take 17 g by mouth daily 1 Bottle 2    diclofenac sodium (VOLTAREN) 1 % GEL Apply topically 2 times daily (Patient not taking: Reported on 5/7/2021) 50 g 0    ibuprofen (ADVIL;MOTRIN) 800 MG tablet Take 1 tablet by mouth every 8 hours as needed for Pain 120 tablet 0    vitamin D3 (CHOLECALCIFEROL) 25 MCG (1000 UT) TABS tablet Take 1 tablet by mouth daily 30 tablet 5    PARoxetine (PAXIL) 20 MG tablet Take 1 tablet by mouth daily 30 tablet 3    EPINEPHrine (EPIPEN 2-ES) 0.3 MG/0.3ML SOAJ injection Inject 0.3 mLs into the skin once for 1 dose Use as directed for allergic reaction 0.3 mL 0     Current Facility-Administered Medications   Medication Dose Route Frequency Provider Last Rate Last Admin    levonorgestrel (MIRENA) IUD 52 mg 1 each  1 each IntraUTERine Once Jacques Mckinley, DO   1 each at 03/09/20 0853             ALLERGIES:  Allergies as of 11/17/2021    (No Known Allergies)       Review Of Systems (11 point):  Constitutional: No fever, chills or malaise; No weight change or fatigue  Head and Eyes: No vision, Headache, Dizziness or trauma in last 12 months  ENT ROS: No hearing, Tinnitis, sinus or taste problems  Hematological and Lymphatic ROS:No Lymphoma, Von Willebrand's, Hemophillia or Bleeding History  Psych ROS: No Depression, Homicidal thoughts,suicidal thoughts, or anxiety  Breast ROS: No prior breast abnormalities or lumps  Respiratory ROS: No SOB, Pneumoniae,Cough, or Pulmonary Embolism History  Cardiovascular ROS: No Chest Pain with Exertion, Palpitations, Syncope, Edema, Arrhythmia  Gastrointestinal ROS: No Indigestion, Heartburn, Nausea, vomiting, Diarrhea, Constipation,or Bowel Changes; No Bloody Stools or melena  Genito-Urinary ROS: No Dysuria, Hematuria or Nocturia. No Urinary Incontinence or Vaginal Discharge  Musculoskeletal ROS: No Arthralgia, Arthritis,Gout,Osteoporosis or Rheumatism  Neurological ROS: No CVA, Migraines, Epilepsy, Seizure Hx, or Limb Weakness  Dermatological ROS: No Rash, Itching, Hives, Mole Changes or Cancer          Blood pressure 116/74, resp. rate 16, weight 216 lb 6 oz (98.1 kg), not currently breastfeeding. Abdomen: Soft non-tender; good bowel sounds. No guarding, rebound or rigidity. No CVA tenderness bilaterally. Extremities: No calf tenderness, DTR 2/4, and No edema bilaterally    Pelvic: IUD strings easily visible at external OS and grasped with bosman forcep and IUD removed intact today. No bleeding noted. Patient tolerated procedure well. Diagnostics:  No results found.     Lab Results:  Results for orders placed or performed during the hospital encounter of 01/05/21   CBC Auto Differential   Result Value Ref Range    WBC 7.5 3.5 - 11.3 k/uL    RBC 4.67 3.95 - 5.11 m/uL    Hemoglobin 12.9 11.9 - 15.1 g/dL    Hematocrit 42.3 36.3 - 47.1 %    MCV 90.6 82.6 - 102.9 fL    MCH 27.6 25.2 - 33.5 pg    MCHC 30.5 28.4 - 34.8 g/dL    RDW 14.3 11.8 - 14.4 %    Platelets 547 202 - 676 k/uL    MPV 9.6 8.1 - 13.5 fL    NRBC Automated 0.0 0.0 per 100 WBC    Differential Type NOT REPORTED     Seg Neutrophils 64 36 - 65 %    Lymphocytes 25 24 - 43 %    Monocytes 7 3 - 12 %    Eosinophils % 2 1 - 4 %    Basophils 1 0 - 2 %    Immature Granulocytes 1 (H) 0 %    Segs Absolute 4.88 1.50 - 8.10 k/uL    Absolute Lymph # 1.91 1.10 - 3.70 k/uL    Absolute Mono # 0.51 0.10 - 1.20 k/uL    Absolute Eos # 0.14 0.00 - 0.44 k/uL    Basophils Absolute 0.05 0.00 - 0.20 k/uL    Absolute Immature Granulocyte 0.04 0.00 - 0.30 k/uL    WBC Morphology NOT REPORTED     RBC Morphology NOT REPORTED     Platelet Estimate NOT REPORTED    Comprehensive Metabolic Panel   Result Value Ref Range    Glucose 108 (H) 70 - 99 mg/dL    BUN 14 6 - 20 mg/dL    CREATININE 0.85 0.50 - 0.90 mg/dL    Bun/Cre Ratio NOT REPORTED 9 - 20    Calcium 8.9 8.6 - 10.4 mg/dL    Sodium 143 135 - 144 mmol/L    Potassium 4.3 3.7 - 5.3 mmol/L    Chloride 107 98 - 107 mmol/L    CO2 23 20 - 31 mmol/L    Anion Gap 13 9 - 17 mmol/L    Alkaline Phosphatase 90 35 - 104 U/L    ALT 10 5 - 33 U/L    AST 13 <32 U/L    Total Bilirubin 0.31 0.3 - 1.2 mg/dL    Total Protein 6.6 6.4 - 8.3 g/dL    Albumin 3.8 3.5 - 5.2 g/dL    Albumin/Globulin Ratio 1.4 1.0 - 2.5    GFR Non-African American >60 >60 mL/min    GFR African American >60 >60 mL/min    GFR Comment          GFR Staging NOT REPORTED    KIANNA Screen With Reflex   Result Value Ref Range    KIANNA NEGATIVE NEGATIVE   Rheumatoid Factor   Result Value Ref Range    Rheumatoid Factor <10 <14 IU/mL   Sedimentation Rate   Result Value Ref Range    Sed Rate 28 (H) 0 - 20 mm   C-Reactive Protein   Result Value Ref Range    CRP 18.0 (H) 0.0 - 5.0 mg/L   TTIGA with Reflex to AEMAT Result Value Ref Range    TISSUE TRANSGLUTAMINASE IGA 0.3 <7.0 U/mL         Assessment:   Diagnosis Orders   1.  Encounter for IUD removal  [phone] - [place] REMOVE INTRAUTERINE DEVICE     Patient Active Problem List    Diagnosis Date Noted    Elevated C-reactive protein (CRP) 01/06/2021    Elevated erythrocyte sedimentation rate 01/06/2021    Arthralgia 01/06/2021    LGSIL of cervix of undetermined significance 04/14/2020    HPV in female 04/14/2020    Snoring     Cyst of thyroid 02/26/2020    Obstructive sleep apnea syndrome 02/26/2020    Vitamin D deficiency 02/26/2020    Hypercholesterolemia 02/26/2020    Irregular periods     Mixed anxiety and depressive disorder 02/18/2020    History of PCOS 02/18/2020    History of appendectomy 02/18/2020    Family history of renal stone 02/18/2020    History of removal of ureteral stent 02/18/2020    Family history of thyroid disease 02/18/2020    FH: depression 02/18/2020    Family history of coronary artery disease 02/18/2020    FH: hypertension 02/18/2020       PLAN:  IUD removed  RTO as needed for annual exam when due  Orders Placed This Encounter   Procedures    [id number] - [place] REMOVE INTRAUTERINE DEVICE

## 2022-01-27 ENCOUNTER — OFFICE VISIT (OUTPATIENT)
Dept: FAMILY MEDICINE CLINIC | Age: 37
End: 2022-01-27
Payer: COMMERCIAL

## 2022-01-27 VITALS
HEIGHT: 60 IN | WEIGHT: 223 LBS | OXYGEN SATURATION: 97 % | DIASTOLIC BLOOD PRESSURE: 77 MMHG | SYSTOLIC BLOOD PRESSURE: 116 MMHG | TEMPERATURE: 97.3 F | BODY MASS INDEX: 43.78 KG/M2 | HEART RATE: 90 BPM

## 2022-01-27 DIAGNOSIS — F41.8 MIXED ANXIETY AND DEPRESSIVE DISORDER: ICD-10-CM

## 2022-01-27 DIAGNOSIS — H65.03 NON-RECURRENT ACUTE SEROUS OTITIS MEDIA OF BOTH EARS: Primary | ICD-10-CM

## 2022-01-27 DIAGNOSIS — M54.2 CHRONIC NECK PAIN: ICD-10-CM

## 2022-01-27 DIAGNOSIS — E78.00 HYPERCHOLESTEROLEMIA: ICD-10-CM

## 2022-01-27 DIAGNOSIS — Z11.59 NEED FOR HEPATITIS C SCREENING TEST: ICD-10-CM

## 2022-01-27 DIAGNOSIS — G89.29 CHRONIC NECK PAIN: ICD-10-CM

## 2022-01-27 DIAGNOSIS — Z23 IMMUNIZATION DUE: ICD-10-CM

## 2022-01-27 PROCEDURE — G8482 FLU IMMUNIZE ORDER/ADMIN: HCPCS | Performed by: STUDENT IN AN ORGANIZED HEALTH CARE EDUCATION/TRAINING PROGRAM

## 2022-01-27 PROCEDURE — 1036F TOBACCO NON-USER: CPT | Performed by: STUDENT IN AN ORGANIZED HEALTH CARE EDUCATION/TRAINING PROGRAM

## 2022-01-27 PROCEDURE — 99214 OFFICE O/P EST MOD 30 MIN: CPT | Performed by: STUDENT IN AN ORGANIZED HEALTH CARE EDUCATION/TRAINING PROGRAM

## 2022-01-27 PROCEDURE — 90715 TDAP VACCINE 7 YRS/> IM: CPT | Performed by: STUDENT IN AN ORGANIZED HEALTH CARE EDUCATION/TRAINING PROGRAM

## 2022-01-27 PROCEDURE — 90674 CCIIV4 VAC NO PRSV 0.5 ML IM: CPT | Performed by: STUDENT IN AN ORGANIZED HEALTH CARE EDUCATION/TRAINING PROGRAM

## 2022-01-27 PROCEDURE — G8417 CALC BMI ABV UP PARAM F/U: HCPCS | Performed by: STUDENT IN AN ORGANIZED HEALTH CARE EDUCATION/TRAINING PROGRAM

## 2022-01-27 PROCEDURE — G8427 DOCREV CUR MEDS BY ELIG CLIN: HCPCS | Performed by: STUDENT IN AN ORGANIZED HEALTH CARE EDUCATION/TRAINING PROGRAM

## 2022-01-27 RX ORDER — IBUPROFEN 800 MG/1
800 TABLET ORAL EVERY 8 HOURS PRN
Qty: 120 TABLET | Refills: 0 | Status: SHIPPED | OUTPATIENT
Start: 2022-01-27 | End: 2022-07-15

## 2022-01-27 RX ORDER — AMOXICILLIN AND CLAVULANATE POTASSIUM 875; 125 MG/1; MG/1
1 TABLET, FILM COATED ORAL 2 TIMES DAILY
Qty: 14 TABLET | Refills: 0 | Status: SHIPPED | OUTPATIENT
Start: 2022-01-27 | End: 2022-02-03

## 2022-01-27 SDOH — ECONOMIC STABILITY: FOOD INSECURITY: WITHIN THE PAST 12 MONTHS, THE FOOD YOU BOUGHT JUST DIDN'T LAST AND YOU DIDN'T HAVE MONEY TO GET MORE.: NEVER TRUE

## 2022-01-27 SDOH — ECONOMIC STABILITY: FOOD INSECURITY: WITHIN THE PAST 12 MONTHS, YOU WORRIED THAT YOUR FOOD WOULD RUN OUT BEFORE YOU GOT MONEY TO BUY MORE.: NEVER TRUE

## 2022-01-27 ASSESSMENT — ENCOUNTER SYMPTOMS
COUGH: 0
EYE DISCHARGE: 0
WHEEZING: 0
DIARRHEA: 0
SORE THROAT: 0
CONSTIPATION: 0
SHORTNESS OF BREATH: 0
ABDOMINAL PAIN: 0
VOMITING: 0
CHEST TIGHTNESS: 0
NAUSEA: 0

## 2022-01-27 ASSESSMENT — PATIENT HEALTH QUESTIONNAIRE - PHQ9
SUM OF ALL RESPONSES TO PHQ9 QUESTIONS 1 & 2: 0
SUM OF ALL RESPONSES TO PHQ QUESTIONS 1-9: 0
3. TROUBLE FALLING OR STAYING ASLEEP: 0
SUM OF ALL RESPONSES TO PHQ QUESTIONS 1-9: 0
6. FEELING BAD ABOUT YOURSELF - OR THAT YOU ARE A FAILURE OR HAVE LET YOURSELF OR YOUR FAMILY DOWN: 0
SUM OF ALL RESPONSES TO PHQ QUESTIONS 1-9: 0
2. FEELING DOWN, DEPRESSED OR HOPELESS: 0
1. LITTLE INTEREST OR PLEASURE IN DOING THINGS: 0
4. FEELING TIRED OR HAVING LITTLE ENERGY: 0
7. TROUBLE CONCENTRATING ON THINGS, SUCH AS READING THE NEWSPAPER OR WATCHING TELEVISION: 0
8. MOVING OR SPEAKING SO SLOWLY THAT OTHER PEOPLE COULD HAVE NOTICED. OR THE OPPOSITE, BEING SO FIGETY OR RESTLESS THAT YOU HAVE BEEN MOVING AROUND A LOT MORE THAN USUAL: 0
5. POOR APPETITE OR OVEREATING: 0
SUM OF ALL RESPONSES TO PHQ QUESTIONS 1-9: 0
10. IF YOU CHECKED OFF ANY PROBLEMS, HOW DIFFICULT HAVE THESE PROBLEMS MADE IT FOR YOU TO DO YOUR WORK, TAKE CARE OF THINGS AT HOME, OR GET ALONG WITH OTHER PEOPLE: 0
9. THOUGHTS THAT YOU WOULD BE BETTER OFF DEAD, OR OF HURTING YOURSELF: 0

## 2022-01-27 ASSESSMENT — SOCIAL DETERMINANTS OF HEALTH (SDOH): HOW HARD IS IT FOR YOU TO PAY FOR THE VERY BASICS LIKE FOOD, HOUSING, MEDICAL CARE, AND HEATING?: NOT HARD AT ALL

## 2022-01-27 NOTE — PROGRESS NOTES
[address] [facility]  [address] [place] [address]  Dept: [phone]  Dept Fax: [phone]    Swathi Portillo is a 39 y.o. female who is a Established patient, who presents today for her medical conditions/complaints as noted below:  Chief Complaint   Patient presents with    Otalgia     left ear    Facial Swelling     left side          HPI:     She is here today complaining of pain in her left ear and back of her left jaw. She says that she also noticed as if her face was swollen on the left side since yesterday. She says she gets frequent ear infection and is also having an issue with her left lower wisdom tooth for which she needs to see a dentist.  Says that she is not able to find a dentist that is covered in her insurance. She says she is no longer taking Paxil and has been doing well. She still continues to have neck pain after the accident takes and takes ibuprofen as needed. She also stopped taking Topamax for her headaches and says that the frequency has reduced and she does not need it. She is due for her annual labs.       Hemoglobin A1C (%)   Date Value   02/21/2020 5.7             ( goal A1Cis < 7)   No results found for: LABMICR  LDL Cholesterol (mg/dL)   Date Value   02/21/2020 136 (H)       (goal LDL is <100)   AST (U/L)   Date Value   01/05/2021 13     ALT (U/L)   Date Value   01/05/2021 10     BUN (mg/dL)   Date Value   01/05/2021 14     BP Readings from Last 3 Encounters:   01/27/22 116/77   11/17/21 116/74   05/07/21 126/88          (goal 120/80)    Past Medical History:   Diagnosis Date    Depression     Irregular periods     Sleep disturbance       Past Surgical History:   Procedure Laterality Date    APPENDECTOMY      COLPOSCOPY  04/14/2020    Dr. Hamilton Hidden- Pap Showed LGSIL + hpv- Bx taken ECC 11 and 1    KIDNEY SURGERY Bilateral        Family History   Problem Relation Age of Onset    Breast Cancer Maternal Cousin 55        2nd degree Social History     Tobacco Use    Smoking status: Never Smoker    Smokeless tobacco: Never Used   Substance Use Topics    Alcohol use: Yes      Current Outpatient Medications   Medication Sig Dispense Refill    ibuprofen (ADVIL;MOTRIN) 800 MG tablet Take 1 tablet by mouth every 8 hours as needed for Pain 120 tablet 0    amoxicillin-clavulanate (AUGMENTIN) 875-125 MG per tablet Take 1 tablet by mouth 2 times daily for 7 days 14 tablet 0    diclofenac sodium (VOLTAREN) 1 % GEL Apply topically 2 times daily 50 g 0    vitamin D3 (CHOLECALCIFEROL) 25 MCG (1000 UT) TABS tablet Take 1 tablet by mouth daily 30 tablet 5     Current Facility-Administered Medications   Medication Dose Route Frequency Provider Last Rate Last Admin    levonorgestrel (MIRENA) IUD 52 mg 1 each  1 each IntraUTERine Once [place] [employer], DO   1 each at 03/09/20 0853     No Known Allergies    Health Maintenance   Topic Date Due    Hepatitis C screen  Never done    Depression Monitoring  Never done    A1C test (Diabetic or Prediabetic)  02/21/2021    COVID-19 Vaccine (3 - Booster for Pfizer series) 10/08/2021    Cervical cancer screen  02/25/2025    DTaP/Tdap/Td vaccine (2 - Td or Tdap) 01/27/2032    Flu vaccine  Completed    HIV screen  Completed    Hepatitis A vaccine  Aged Out    Hepatitis B vaccine  Aged Out    Hib vaccine  Aged Out    Meningococcal (ACWY) vaccine  Aged Out    Pneumococcal 0-64 years Vaccine  Aged Out    Varicella vaccine  Discontinued       Subjective:     Review of Systems   Constitutional: Negative for appetite change, fatigue and fever. HENT: Positive for dental problem and ear pain. Negative for congestion, hearing loss and sore throat. Eyes: Negative for discharge and visual disturbance. Respiratory: Negative for cough, chest tightness, shortness of breath and wheezing. Cardiovascular: Negative for chest pain, palpitations and leg swelling.    Gastrointestinal: Negative for abdominal pain, constipation, diarrhea, nausea and vomiting. Genitourinary: Negative for flank pain, frequency, hematuria and urgency. Musculoskeletal: Negative for arthralgias, gait problem, joint swelling and myalgias. Skin: Negative. Neurological: Negative for dizziness, weakness, numbness and headaches. Psychiatric/Behavioral: Negative. Negative for dysphoric mood. The patient is not nervous/anxious. Objective:     Physical Exam  Vitals reviewed. Constitutional:       Appearance: Normal appearance. She is normal weight. HENT:      Head: Normocephalic and atraumatic. Comments: Mild swelling left side of the face near the jawline     Right Ear: A middle ear effusion is present. Tympanic membrane is erythematous. Left Ear: A middle ear effusion is present. Tympanic membrane is erythematous. Nose: Nose normal.      Mouth/Throat:      Mouth: Mucous membranes are moist.      Dentition: Abnormal dentition. Pharynx: Oropharynx is clear. Eyes:      Extraocular Movements: Extraocular movements intact. Conjunctiva/sclera: Conjunctivae normal.      Pupils: Pupils are equal, round, and reactive to light. Cardiovascular:      Rate and Rhythm: Normal rate and regular rhythm. Heart sounds: Normal heart sounds. No murmur heard. No gallop. Pulmonary:      Effort: Pulmonary effort is normal. No respiratory distress. Breath sounds: Normal breath sounds. No stridor. No wheezing. Abdominal:      General: Bowel sounds are normal. There is no distension. Palpations: Abdomen is soft. Tenderness: There is no abdominal tenderness. There is no guarding or rebound. Musculoskeletal:         General: No swelling or tenderness. Normal range of motion. Cervical back: Normal range of motion and neck supple. Skin:     General: Skin is warm and dry. Coloration: Skin is not jaundiced. Findings: No rash. Neurological:      General: No focal deficit present. Mental Status: She is alert and oriented to person, place, and time. Psychiatric:         Mood and Affect: Mood normal.         Behavior: Behavior normal.         Thought Content: Thought content normal.         Judgment: Judgment normal.       /77   Pulse 90   Temp 97.3 °F (36.3 °C)   Ht 5' (1.524 m)   Wt 223 lb (101.2 kg)   SpO2 97%   BMI 43.55 kg/m²     Assessment/Plan:   1. Non-recurrent acute serous otitis media of both ears  -     amoxicillin-clavulanate (AUGMENTIN) 875-125 MG per tablet; Take 1 tablet by mouth 2 times daily for 7 days, Disp-14 tablet, R-0Normal  2. Mixed anxiety and depressive disorder  -     TSH with Reflex; Future  -     Vitamin D 25 Hydroxy; Future  3. Hypercholesterolemia  -     Lipid, Fasting; Future  4. Chronic neck pain  -     ibuprofen (ADVIL;MOTRIN) 800 MG tablet; Take 1 tablet by mouth every 8 hours as needed for Pain, Disp-120 tablet, R-0Normal  5. BMI 40.0-44.9, adult (HCC)  -     CBC Auto Differential; Future  -     Comprehensive Metabolic Panel, Fasting; Future  -     TSH with Reflex; Future  -     Vitamin D 25 Hydroxy; Future  6. Need for hepatitis C screening test  -     Hepatitis C Antibody; Future  7. Immunization due  -     INFLUENZA, MDCK QUADV, 2 YRS AND OLDER, IM, PF, PREFILL SYR OR SDV, 0.5ML (FLUCELVAX QUADV, PF)  -     Tdap (age 6y and older) IM (BOOSTRIX)    Otitis media-started on Augmentin 7-day course. Advised to follow-up with dentist for possible dental infection. Anxiety and depression-no longer on Paxil, says that she is doing well.     Hyperlipidemia-not on any meds, repeat lipids ordered      Return in about 1 year (around 1/27/2023) for annual.    Orders Placed This Encounter   Procedures    INFLUENZA, MDCK QUADV, 2 YRS AND OLDER, IM, PF, PREFILL SYR OR SDV, 0.5ML (FLUCELVAX QUADV, PF)    Tdap (age 6y and older) IM (BOOSTRIX)    CBC Auto Differential     Standing Status:   Future     Standing Expiration Date:   7/27/2022   [facility] Comprehensive Metabolic Panel, Fasting     Standing Status:   Future     Standing Expiration Date:   7/27/2022    Lipid, Fasting     Standing Status:   Future     Standing Expiration Date:   7/27/2022    TSH with Reflex     Standing Status:   Future     Standing Expiration Date:   7/27/2022    Vitamin D 25 Hydroxy     Standing Status:   Future     Standing Expiration Date:   7/27/2022    Hepatitis C Antibody     Standing Status:   Future     Standing Expiration Date:   7/27/2022     Orders Placed This Encounter   Medications    ibuprofen (ADVIL;MOTRIN) 800 MG tablet     Sig: Take 1 tablet by mouth every 8 hours as needed for Pain     Dispense:  120 tablet     Refill:  0    amoxicillin-clavulanate (AUGMENTIN) 875-125 MG per tablet     Sig: Take 1 tablet by mouth 2 times daily for 7 days     Dispense:  14 tablet     Refill:  0       Patient given educational materials - see patient instructions. Discussed use, benefit, and side effects of prescribed medications. All patientquestions answered. Pt voiced understanding. Reviewed health maintenance. Instructedto continue current medications, diet and exercise. Patient agreed with treatmentplan. Follow up as directed.      Electronically signed by Demar Tavera MD on 1/27/2022 at 1:50 PM

## 2022-02-10 ENCOUNTER — PATIENT MESSAGE (OUTPATIENT)
Dept: OBGYN CLINIC | Age: 37
End: 2022-02-10

## 2022-02-11 RX ORDER — VALACYCLOVIR HYDROCHLORIDE 500 MG/1
500 TABLET, FILM COATED ORAL 2 TIMES DAILY
Qty: 10 TABLET | Refills: 1 | Status: SHIPPED | OUTPATIENT
Start: 2022-02-11 | End: 2022-02-16

## 2022-03-08 DIAGNOSIS — H81.13 BENIGN PAROXYSMAL POSITIONAL VERTIGO DUE TO BILATERAL VESTIBULAR DISORDER: Primary | ICD-10-CM

## 2022-03-08 RX ORDER — ONDANSETRON 4 MG/1
4 TABLET, FILM COATED ORAL 3 TIMES DAILY PRN
Qty: 15 TABLET | Refills: 0 | Status: SHIPPED | OUTPATIENT
Start: 2022-03-08 | End: 2022-07-15

## 2022-03-08 RX ORDER — MECLIZINE HYDROCHLORIDE 25 MG/1
25 TABLET ORAL 3 TIMES DAILY PRN
Qty: 30 TABLET | Refills: 0 | Status: SHIPPED | OUTPATIENT
Start: 2022-03-08 | End: 2022-03-18

## 2022-03-16 ENCOUNTER — OFFICE VISIT (OUTPATIENT)
Dept: FAMILY MEDICINE CLINIC | Age: 37
End: 2022-03-16
Payer: COMMERCIAL

## 2022-03-16 VITALS
HEART RATE: 86 BPM | BODY MASS INDEX: 45.94 KG/M2 | HEIGHT: 60 IN | DIASTOLIC BLOOD PRESSURE: 88 MMHG | SYSTOLIC BLOOD PRESSURE: 123 MMHG | TEMPERATURE: 97.3 F | WEIGHT: 234 LBS | OXYGEN SATURATION: 98 %

## 2022-03-16 DIAGNOSIS — Z11.59 NEED FOR HEPATITIS C SCREENING TEST: ICD-10-CM

## 2022-03-16 DIAGNOSIS — E55.9 VITAMIN D DEFICIENCY: ICD-10-CM

## 2022-03-16 DIAGNOSIS — E78.00 HYPERCHOLESTEROLEMIA: ICD-10-CM

## 2022-03-16 DIAGNOSIS — R59.0 LYMPHADENOPATHY, AXILLARY: Primary | ICD-10-CM

## 2022-03-16 PROCEDURE — 99214 OFFICE O/P EST MOD 30 MIN: CPT | Performed by: STUDENT IN AN ORGANIZED HEALTH CARE EDUCATION/TRAINING PROGRAM

## 2022-03-16 PROCEDURE — G8482 FLU IMMUNIZE ORDER/ADMIN: HCPCS | Performed by: STUDENT IN AN ORGANIZED HEALTH CARE EDUCATION/TRAINING PROGRAM

## 2022-03-16 PROCEDURE — G8427 DOCREV CUR MEDS BY ELIG CLIN: HCPCS | Performed by: STUDENT IN AN ORGANIZED HEALTH CARE EDUCATION/TRAINING PROGRAM

## 2022-03-16 PROCEDURE — 1036F TOBACCO NON-USER: CPT | Performed by: STUDENT IN AN ORGANIZED HEALTH CARE EDUCATION/TRAINING PROGRAM

## 2022-03-16 PROCEDURE — G8417 CALC BMI ABV UP PARAM F/U: HCPCS | Performed by: STUDENT IN AN ORGANIZED HEALTH CARE EDUCATION/TRAINING PROGRAM

## 2022-03-16 RX ORDER — MELATONIN
1000 DAILY
Qty: 30 TABLET | Refills: 5 | Status: SHIPPED | OUTPATIENT
Start: 2022-03-16 | End: 2022-03-18 | Stop reason: ALTCHOICE

## 2022-03-16 ASSESSMENT — ENCOUNTER SYMPTOMS
SORE THROAT: 0
CONSTIPATION: 0
NAUSEA: 0
EYE DISCHARGE: 0
ABDOMINAL PAIN: 0
SHORTNESS OF BREATH: 0
WHEEZING: 0
DIARRHEA: 0
CHEST TIGHTNESS: 0
COUGH: 0
VOMITING: 0

## 2022-03-16 NOTE — PROGRESS NOTES
[address] PRIMARY CARE  [address] [place] [address]  Dept: [phone]  Dept Fax: [phone]    Roxann Dudley is a 39 y.o. female who is a Established patient, who presents today for her medical conditions/complaints as noted below:  Chief Complaint   Patient presents with    Mass     armpit area -sore          HPI:     She is here today complaining of pain and swelling in her left armpit. She says that it started about 2 weeks ago and she was seen at an urgent care where they told her that it should resolve in some time but it has not so far. She still continues to have pain in her armpit. She got her Covid vaccine about 3 weeks ago and her symptoms started about a week after that. She is also currently on antibiotic amoxicillin for her dental procedure. She denies having any fevers, chills, cough or any shortness of breath.          Hemoglobin A1C (%)   Date Value   02/21/2020 5.7             ( goal A1Cis < 7)   No results found for: LABMICR  LDL Cholesterol (mg/dL)   Date Value   02/21/2020 136 (H)       (goal LDL is <100)   AST (U/L)   Date Value   01/05/2021 13     ALT (U/L)   Date Value   01/05/2021 10     BUN (mg/dL)   Date Value   01/05/2021 14     BP Readings from Last 3 Encounters:   03/16/22 123/88   01/27/22 116/77   11/17/21 116/74          (goal 120/80)    Past Medical History:   Diagnosis Date    Depression     Irregular periods     Sleep disturbance       Past Surgical History:   Procedure Laterality Date    APPENDECTOMY      COLPOSCOPY  04/14/2020    Dr. Leila Lewis- Pap Showed LGSIL + hpv- Bx taken [facility] 11 and 1    KIDNEY SURGERY Bilateral        Family History   Problem Relation Age of Onset    Breast Cancer Maternal Cousin 55        2nd degree       Social History     Tobacco Use    Smoking status: Never Smoker    Smokeless tobacco: Never Used   Substance Use Topics    Alcohol use: Yes      Current Outpatient Medications   Medication Sig Dispense Refill    vitamin D3 (CHOLECALCIFEROL) 25 MCG (1000 UT) TABS tablet Take 1 tablet by mouth daily 30 tablet 5    meclizine (ANTIVERT) 25 MG tablet Take 1 tablet by mouth 3 times daily as needed for Dizziness 30 tablet 0    ondansetron (ZOFRAN) 4 MG tablet Take 1 tablet by mouth 3 times daily as needed for Nausea or Vomiting 15 tablet 0    ibuprofen (ADVIL;MOTRIN) 800 MG tablet Take 1 tablet by mouth every 8 hours as needed for Pain 120 tablet 0    diclofenac sodium (VOLTAREN) 1 % GEL Apply topically 2 times daily 50 g 0     Current Facility-Administered Medications   Medication Dose Route Frequency Provider Last Rate Last Admin    levonorgestrel (MIRENA) IUD 52 mg 1 each  1 each IntraUTERine Once Alverta Leyden, DO   1 each at 03/09/20 0853     No Known Allergies    Health Maintenance   Topic Date Due    Hepatitis C screen  Never done    A1C test (Diabetic or Prediabetic)  02/21/2021    Depression Monitoring  01/27/2023    Cervical cancer screen  02/25/2025    DTaP/Tdap/Td vaccine (2 - Td or Tdap) 01/27/2032    Flu vaccine  Completed    COVID-19 Vaccine  Completed    HIV screen  Completed    Hepatitis A vaccine  Aged Out    Hepatitis B vaccine  Aged Out    Hib vaccine  Aged Out    Meningococcal (ACWY) vaccine  Aged Out    Pneumococcal 0-64 years Vaccine  Aged Out    Varicella vaccine  Discontinued       Subjective:     Review of Systems   Constitutional: Negative for appetite change, fatigue and fever. HENT: Negative for congestion, ear pain, hearing loss and sore throat. Eyes: Negative for discharge and visual disturbance. Respiratory: Negative for cough, chest tightness, shortness of breath and wheezing. Cardiovascular: Negative for chest pain, palpitations and leg swelling. Gastrointestinal: Negative for abdominal pain, constipation, diarrhea, nausea and vomiting. Genitourinary: Negative for flank pain, frequency, hematuria and urgency.    Musculoskeletal: Negative for 97.3 °F (36.3 °C)   Ht 5' (1.524 m)   Wt 234 lb (106.1 kg)   SpO2 98%   BMI 45.70 kg/m²     Assessment/Plan:   1. Lymphadenopathy, axillary  -     US BREAST COMPLETE LEFT; Future  2. Hypercholesterolemia  -     Lipid, Fasting; Future  3. Vitamin D deficiency  -     vitamin D3 (CHOLECALCIFEROL) 25 MCG (1000 UT) TABS tablet; Take 1 tablet by mouth daily, Disp-30 tablet, R-5Normal  -     Vitamin D 25 Hydroxy; Future  4. BMI 45.0-49.9, adult (HCC)  -     CBC with Auto Differential; Future  -     Comprehensive Metabolic Panel, Fasting; Future  -     Hemoglobin A1C; Future  -     TSH with Reflex; Future  5. Need for hepatitis C screening test  -     Hepatitis C Antibody; Future    Axillary lymphadenopathy-likely secondary to Covid vaccine reaction, will get ultrasound to rule out any pathology. Currently on antibiotic amoxicillin for her dental procedure. Hyperlipidemia-not on any medications currently, repeat lipid panel ordered    No follow-ups on file.     Orders Placed This Encounter   Procedures    US BREAST COMPLETE LEFT     Standing Status:   Future     Standing Expiration Date:   5/16/2022     Order Specific Question:   Reason for exam:     Answer:   painful lymph nodes    CBC with Auto Differential     Standing Status:   Future     Standing Expiration Date:   9/16/2022    Comprehensive Metabolic Panel, Fasting     Standing Status:   Future     Standing Expiration Date:   9/16/2022    Hemoglobin A1C     Standing Status:   Future     Standing Expiration Date:   9/16/2022    Lipid, Fasting     Standing Status:   Future     Standing Expiration Date:   9/16/2022    TSH with Reflex     Standing Status:   Future     Standing Expiration Date:   9/16/2022    Vitamin D 25 Hydroxy     Standing Status:   Future     Standing Expiration Date:   9/16/2022    Hepatitis C Antibody     Standing Status:   Future     Standing Expiration Date:   9/16/2022     Orders Placed This Encounter   Medications    vitamin D3 (CHOLECALCIFEROL) 25 MCG (1000 UT) TABS tablet     Sig: Take 1 tablet by mouth daily     Dispense:  30 tablet     Refill:  5       Patient given educational materials - see patient instructions. Discussed use, benefit, and side effects of prescribed medications. All patientquestions answered. Pt voiced understanding. Reviewed health maintenance. Instructedto continue current medications, diet and exercise. Patient agreed with treatmentplan. Follow up as directed.      Electronically signed by Nereida Ríos MD on 3/16/2022 at 5:02 PM

## 2022-03-18 ENCOUNTER — TELEPHONE (OUTPATIENT)
Dept: FAMILY MEDICINE CLINIC | Age: 37
End: 2022-03-18

## 2022-03-18 ENCOUNTER — HOSPITAL ENCOUNTER (OUTPATIENT)
Age: 37
Setting detail: SPECIMEN
Discharge: HOME | End: 2022-03-18

## 2022-03-18 DIAGNOSIS — E55.9 VITAMIN D DEFICIENCY: Primary | ICD-10-CM

## 2022-03-18 DIAGNOSIS — E78.00 HYPERCHOLESTEROLEMIA: ICD-10-CM

## 2022-03-18 DIAGNOSIS — E55.9 VITAMIN D DEFICIENCY: ICD-10-CM

## 2022-03-18 DIAGNOSIS — R59.0 LYMPHADENOPATHY, AXILLARY: Primary | ICD-10-CM

## 2022-03-18 DIAGNOSIS — Z11.59 NEED FOR HEPATITIS C SCREENING TEST: ICD-10-CM

## 2022-03-18 LAB
ABSOLUTE EOS #: 0.12 K/UL (ref 0–0.44)
ABSOLUTE IMMATURE GRANULOCYTE: 0.05 K/UL (ref 0–0.3)
ABSOLUTE LYMPH #: 1.84 K/UL (ref 1.1–3.7)
ABSOLUTE MONO #: 0.59 K/UL (ref 0.1–1.2)
ALBUMIN SERPL-MCNC: 3.8 G/DL (ref 3.5–5.2)
ALBUMIN/GLOBULIN RATIO: 1.2 (ref 1–2.5)
ALP BLD-CCNC: 80 U/L (ref 35–104)
ALT SERPL-CCNC: 14 U/L (ref 5–33)
ANION GAP SERPL CALCULATED.3IONS-SCNC: 11 MMOL/L (ref 9–17)
AST SERPL-CCNC: 14 U/L
BASOPHILS # BLD: 1 % (ref 0–2)
BASOPHILS ABSOLUTE: 0.06 K/UL (ref 0–0.2)
BILIRUB SERPL-MCNC: 0.33 MG/DL (ref 0.3–1.2)
BUN BLDV-MCNC: 11 MG/DL (ref 6–20)
CALCIUM SERPL-MCNC: 8.9 MG/DL (ref 8.6–10.4)
CHLORIDE BLD-SCNC: 105 MMOL/L (ref 98–107)
CHOLESTEROL, FASTING: 160 MG/DL
CHOLESTEROL/HDL RATIO: 5.3
CO2: 23 MMOL/L (ref 20–31)
CREAT SERPL-MCNC: 0.64 MG/DL (ref 0.5–0.9)
EOSINOPHILS RELATIVE PERCENT: 1 % (ref 1–4)
ESTIMATED AVERAGE GLUCOSE: 108 MG/DL
GFR AFRICAN AMERICAN: >60 ML/MIN
GFR NON-AFRICAN AMERICAN: >60 ML/MIN
GFR SERPL CREATININE-BSD FRML MDRD: ABNORMAL ML/MIN/{1.73_M2}
GLUCOSE FASTING: 103 MG/DL (ref 70–99)
HBA1C MFR BLD: 5.4 % (ref 4–6)
HCT VFR BLD CALC: 43 % (ref 36.3–47.1)
HDLC SERPL-MCNC: 30 MG/DL
HEMOGLOBIN: 13.6 G/DL (ref 11.9–15.1)
HEPATITIS C ANTIBODY: NONREACTIVE
IMMATURE GRANULOCYTES: 1 %
LDL CHOLESTEROL: 115 MG/DL (ref 0–130)
LYMPHOCYTES # BLD: 21 % (ref 24–43)
MCH RBC QN AUTO: 28.9 PG (ref 25.2–33.5)
MCHC RBC AUTO-ENTMCNC: 31.6 G/DL (ref 28.4–34.8)
MCV RBC AUTO: 91.3 FL (ref 82.6–102.9)
MONOCYTES # BLD: 7 % (ref 3–12)
NRBC AUTOMATED: 0 PER 100 WBC
PDW BLD-RTO: 13.6 % (ref 11.8–14.4)
PLATELET # BLD: ABNORMAL K/UL (ref 138–453)
PLATELET, FLUORESCENCE: 244 K/UL (ref 138–453)
PLATELET, IMMATURE FRACTION: 6.4 % (ref 1.1–10.3)
POTASSIUM SERPL-SCNC: 4.2 MMOL/L (ref 3.7–5.3)
RBC # BLD: 4.71 M/UL (ref 3.95–5.11)
SEG NEUTROPHILS: 69 % (ref 36–65)
SEGMENTED NEUTROPHILS ABSOLUTE COUNT: 5.95 K/UL (ref 1.5–8.1)
SODIUM BLD-SCNC: 139 MMOL/L (ref 135–144)
TOTAL PROTEIN: 6.9 G/DL (ref 6.4–8.3)
TRIGLYCERIDE, FASTING: 75 MG/DL
TSH SERPL DL<=0.05 MIU/L-ACNC: 2.74 MIU/L (ref 0.3–5)
VITAMIN D 25-HYDROXY: 16.2 NG/ML
WBC # BLD: 8.6 K/UL (ref 3.5–11.3)

## 2022-03-18 NOTE — TELEPHONE ENCOUNTER
Faxed request from scheduling \" pt will need bilateral diagnostic eric first per radiologist protocol.  Same dx please\"

## 2022-03-29 ENCOUNTER — HOSPITAL ENCOUNTER (OUTPATIENT)
Dept: ULTRASOUND IMAGING | Age: 37
Discharge: HOME | End: 2022-03-31
Payer: COMMERCIAL

## 2022-03-29 ENCOUNTER — HOSPITAL ENCOUNTER (OUTPATIENT)
Dept: MAMMOGRAPHY | Age: 37
Discharge: HOME | End: 2022-03-31
Payer: COMMERCIAL

## 2022-03-29 DIAGNOSIS — R59.0 LYMPHADENOPATHY, AXILLARY: ICD-10-CM

## 2022-03-29 PROCEDURE — G0279 TOMOSYNTHESIS, MAMMO: HCPCS

## 2022-03-29 PROCEDURE — 76642 ULTRASOUND BREAST LIMITED: CPT

## 2022-07-12 ENCOUNTER — PATIENT MESSAGE (OUTPATIENT)
Dept: FAMILY MEDICINE CLINIC | Age: 37
End: 2022-07-12

## 2022-07-12 NOTE — TELEPHONE ENCOUNTER
From: Derrick Brunner  To: Dr. Carlos De Jesus: 7/12/2022 1:28 PM EDT  Subject: Note for work    Hi, would it be possible for me to get a note to be off work for today? I ended up having a dizzy spell on my lunch and took some meclizine for it, but has not helped. Instead I got shaky and nauseated as well. Please let me know. Thank you.

## 2022-07-15 ENCOUNTER — TELEPHONE (OUTPATIENT)
Dept: FAMILY MEDICINE CLINIC | Age: 37
End: 2022-07-15

## 2022-07-15 ENCOUNTER — OFFICE VISIT (OUTPATIENT)
Dept: FAMILY MEDICINE CLINIC | Age: 37
End: 2022-07-15
Payer: COMMERCIAL

## 2022-07-15 VITALS
HEIGHT: 60 IN | HEART RATE: 75 BPM | SYSTOLIC BLOOD PRESSURE: 129 MMHG | TEMPERATURE: 97.3 F | WEIGHT: 222.2 LBS | OXYGEN SATURATION: 97 % | DIASTOLIC BLOOD PRESSURE: 88 MMHG | BODY MASS INDEX: 43.62 KG/M2

## 2022-07-15 DIAGNOSIS — H60.503 ACUTE OTITIS EXTERNA OF BOTH EARS, UNSPECIFIED TYPE: Primary | ICD-10-CM

## 2022-07-15 DIAGNOSIS — E55.9 VITAMIN D DEFICIENCY: ICD-10-CM

## 2022-07-15 DIAGNOSIS — R11.0 NAUSEA: ICD-10-CM

## 2022-07-15 PROCEDURE — 99213 OFFICE O/P EST LOW 20 MIN: CPT

## 2022-07-15 RX ORDER — ERGOCALCIFEROL 1.25 MG/1
50000 CAPSULE ORAL WEEKLY
Qty: 4 CAPSULE | Refills: 5 | Status: SHIPPED | OUTPATIENT
Start: 2022-07-15

## 2022-07-15 RX ORDER — PREDNISONE 20 MG/1
20 TABLET ORAL DAILY
Qty: 5 TABLET | Refills: 0 | Status: SHIPPED | OUTPATIENT
Start: 2022-07-15 | End: 2022-07-20

## 2022-07-15 RX ORDER — OFLOXACIN 3 MG/ML
5 SOLUTION AURICULAR (OTIC) 2 TIMES DAILY
Qty: 10 ML | Refills: 0 | Status: SHIPPED | OUTPATIENT
Start: 2022-07-15 | End: 2022-07-25

## 2022-07-15 RX ORDER — ONDANSETRON 4 MG/1
4 TABLET, FILM COATED ORAL 3 TIMES DAILY PRN
Qty: 15 TABLET | Refills: 0 | Status: SHIPPED | OUTPATIENT
Start: 2022-07-15

## 2022-07-15 ASSESSMENT — ENCOUNTER SYMPTOMS
SINUS PAIN: 0
EYE REDNESS: 0
ABDOMINAL PAIN: 0
SHORTNESS OF BREATH: 0
NAUSEA: 1
DIARRHEA: 0
WHEEZING: 0
EYE DISCHARGE: 0
EYE ITCHING: 0
SORE THROAT: 0
CHEST TIGHTNESS: 0
COUGH: 0
TROUBLE SWALLOWING: 0
SINUS PRESSURE: 0
VOMITING: 0

## 2022-07-15 NOTE — PROGRESS NOTES
[address] [facility] [facility]  [id number]  7/15/2022    Malachi Mojica is a 39 y.o. female who presents today for her medical conditions and/or complaints as noted below. Malachi Mojica is scheduled today for Dizziness and Nausea  . HPI:     This is a 39-year-old female presenting to the office for dizziness and nausea. Patient states approximately 3 or 4 days ago she began having issues with vertigo and balance and occasional nausea. She she does endorse that she had some clear drainage on the first day from her left ear. She does suffer from frequent ear infections. She also has increased pain and sensitivity to her ears especially with any movement of the external ear. Previous notes reviewed in patient's chart. Vitals:    07/15/22 1113   BP: 129/88   Pulse: 75   Temp: 97.3 °F (36.3 °C)   SpO2: 97%   Weight: 222 lb 3.2 oz (100.8 kg)   Height: 5' (1.524 m)      Past Medical History:   Diagnosis Date    Depression     Irregular periods     Sleep disturbance       Past Surgical History:   Procedure Laterality Date    APPENDECTOMY      COLPOSCOPY  04/14/2020    Dr. Ruiz Cost- Pap Showed LGSIL + hpv- Bx taken [facility] 11 and 1    KIDNEY SURGERY Bilateral      Family History   Problem Relation Age of Onset    Breast Cancer Maternal Cousin 55        2nd degree     Social History     Tobacco Use    Smoking status: Never    Smokeless tobacco: Never   Substance Use Topics    Alcohol use: Yes      Current Outpatient Medications   Medication Sig Dispense Refill    ciprofloxacin-hydrocortisone (CIPRO HC OTIC) 0.2-1 % otic suspension Place 3 drops into both ears in the morning and 3 drops before bedtime. Do all this for 7 days. 1 each 0    predniSONE (DELTASONE) 20 MG tablet Take 1 tablet by mouth in the morning for 5 days.  5 tablet 0    vitamin D (ERGOCALCIFEROL) 1.25 MG (52183 UT) CAPS capsule Take 1 capsule by mouth once a week 4 capsule 5    ondansetron (ZOFRAN) 4 MG tablet Take 1 tablet by mouth 3 times daily as needed for Nausea or Vomiting 15 tablet 0    ibuprofen (ADVIL;MOTRIN) 800 MG tablet Take 1 tablet by mouth every 8 hours as needed for Pain 120 tablet 0    diclofenac sodium (VOLTAREN) 1 % GEL Apply topically 2 times daily 50 g 0     Current Facility-Administered Medications   Medication Dose Route Frequency Provider Last Rate Last Admin    levonorgestrel (MIRENA) IUD 52 mg 1 each  1 each IntraUTERine Once Philemon Spore, DO   1 each at 03/09/20 0853       No Known Allergies    Health Maintenance   Topic Date Due    Flu vaccine (1) 09/01/2022    Depression Monitoring  01/27/2023    Cervical cancer screen  02/25/2025    Diabetes screen  03/18/2025    DTaP/Tdap/Td vaccine (2 - Td or Tdap) 01/27/2032    COVID-19 Vaccine  Completed    Hepatitis C screen  Completed    HIV screen  Completed    Hepatitis A vaccine  Aged Out    Hepatitis B vaccine  Aged Out    Hib vaccine  Aged Out    Meningococcal (ACWY) vaccine  Aged Out    Pneumococcal 0-64 years Vaccine  Aged Out    Varicella vaccine  Discontinued       Subjective:      Review of Systems   Constitutional:  Negative for chills, fatigue and fever. HENT:  Positive for ear discharge and ear pain. Negative for sinus pressure, sinus pain, sore throat and trouble swallowing. Eyes:  Negative for discharge, redness and itching. Respiratory:  Negative for cough, chest tightness, shortness of breath and wheezing. Cardiovascular:  Negative for chest pain. Gastrointestinal:  Positive for nausea. Negative for abdominal pain, diarrhea and vomiting. Genitourinary:  Negative for difficulty urinating. Musculoskeletal:  Negative for arthralgias and neck pain. Skin:  Negative for rash. Neurological:  Negative for dizziness, weakness, light-headedness and headaches. All other systems reviewed and are negative. Objective:     Physical Exam  Constitutional:       General: She is not in acute distress. Appearance: Normal appearance. She is normal weight. She is not ill-appearing. HENT:      Head: Normocephalic and atraumatic. Right Ear: Hearing normal. Drainage and tenderness present. Left Ear: Hearing normal. Drainage and tenderness present. Ears:      Comments: Bilateral erythematous canal     Nose: Nose normal.   Eyes:      Extraocular Movements: Extraocular movements intact. Conjunctiva/sclera: Conjunctivae normal.      Pupils: Pupils are equal, round, and reactive to light. Cardiovascular:      Rate and Rhythm: Normal rate and regular rhythm. Pulses: Normal pulses. Heart sounds: Normal heart sounds. No murmur heard. Pulmonary:      Effort: Pulmonary effort is normal. No respiratory distress. Breath sounds: Normal breath sounds. No wheezing, rhonchi or rales. Abdominal:      General: Abdomen is flat. Palpations: Abdomen is soft. Musculoskeletal:         General: Normal range of motion. Cervical back: Neck supple. Skin:     General: Skin is warm and dry. Capillary Refill: Capillary refill takes less than 2 seconds. Neurological:      General: No focal deficit present. Mental Status: She is alert and oriented to person, place, and time. Psychiatric:         Mood and Affect: Mood normal.        Assessment/Plan:      1. Acute otitis externa of both ears, unspecified type  -     ciprofloxacin-hydrocortisone (CIPRO HC OTIC) 0.2-1 % otic suspension; Place 3 drops into both ears in the morning and 3 drops before bedtime. Do all this for 7 days. , Disp-1 each, R-0Normal  -     predniSONE (DELTASONE) 20 MG tablet; Take 1 tablet by mouth in the morning for 5 days. , Disp-5 tablet, R-0Normal  2. Vitamin D deficiency  -     vitamin D (ERGOCALCIFEROL) 1.25 MG (88123 UT) CAPS capsule; Take 1 capsule by mouth once a week, Disp-4 capsule, R-5Normal  3. Nausea  -     ondansetron (ZOFRAN) 4 MG tablet;  Take 1 tablet by mouth 3 times daily as needed for Nausea or Vomiting, Disp-15 tablet, R-0Normal     Return if symptoms worsen or fail to improve. No orders of the defined types were placed in this encounter. Orders Placed This Encounter   Medications    ciprofloxacin-hydrocortisone (CIPRO HC OTIC) 0.2-1 % otic suspension     Sig: Place 3 drops into both ears in the morning and 3 drops before bedtime. Do all this for 7 days. Dispense:  1 each     Refill:  0    predniSONE (DELTASONE) 20 MG tablet     Sig: Take 1 tablet by mouth in the morning for 5 days. Dispense:  5 tablet     Refill:  0    vitamin D (ERGOCALCIFEROL) 1.25 MG (10541 UT) CAPS capsule     Sig: Take 1 capsule by mouth once a week     Dispense:  4 capsule     Refill:  5    ondansetron (ZOFRAN) 4 MG tablet     Sig: Take 1 tablet by mouth 3 times daily as needed for Nausea or Vomiting     Dispense:  15 tablet     Refill:  0         Reviewed health maintenance, prior labs and imaging. Patient given educational materials - see patient instructions. Discussed use, benefit, and side effects of prescribed medications. Barriers to medication compliance addressed. All patient questions answered. Pt voiced understanding to plan of care. Instructed to continue medications as discussed, healthy diet and exercise. Patient agreed with treatment plan. Follow up as directed below. This note is created with the assistance of a speech-recognition program. While intending to generate a document that actually reflects the content of the visit, no guarantees can be provided that every mistake has been identified and corrected by editing.     Electronically signed by KLAUS Fernando CNP, APRN-CNP on 7/15/2022 at 11:45 AM

## 2022-07-20 DIAGNOSIS — H65.03 NON-RECURRENT ACUTE SEROUS OTITIS MEDIA OF BOTH EARS: Primary | ICD-10-CM

## 2022-07-20 RX ORDER — AMOXICILLIN AND CLAVULANATE POTASSIUM 875; 125 MG/1; MG/1
1 TABLET, FILM COATED ORAL 2 TIMES DAILY
Qty: 14 TABLET | Refills: 0 | Status: SHIPPED | OUTPATIENT
Start: 2022-07-20 | End: 2022-07-27

## 2022-08-15 DIAGNOSIS — H60.93 RECURRENT OTITIS EXTERNA OF BOTH EARS: Primary | ICD-10-CM

## 2022-09-16 NOTE — TELEPHONE ENCOUNTER
Informed patient that her note is ready, patient stated she will come by the office to pick it up. negative - no change in level of consciousness

## 2022-11-18 ENCOUNTER — OFFICE VISIT (OUTPATIENT)
Dept: FAMILY MEDICINE CLINIC | Age: 37
End: 2022-11-18
Payer: COMMERCIAL

## 2022-11-18 VITALS
SYSTOLIC BLOOD PRESSURE: 132 MMHG | OXYGEN SATURATION: 96 % | BODY MASS INDEX: 44.57 KG/M2 | HEIGHT: 60 IN | DIASTOLIC BLOOD PRESSURE: 88 MMHG | TEMPERATURE: 97.6 F | HEART RATE: 99 BPM | WEIGHT: 227 LBS

## 2022-11-18 DIAGNOSIS — G89.29 CHRONIC NECK PAIN: ICD-10-CM

## 2022-11-18 DIAGNOSIS — R11.0 NAUSEA: ICD-10-CM

## 2022-11-18 DIAGNOSIS — R51.9 CHRONIC LEFT-SIDED HEADACHE: Primary | ICD-10-CM

## 2022-11-18 DIAGNOSIS — M54.2 CHRONIC NECK PAIN: ICD-10-CM

## 2022-11-18 DIAGNOSIS — Z23 NEEDS FLU SHOT: ICD-10-CM

## 2022-11-18 DIAGNOSIS — G89.29 CHRONIC LEFT-SIDED HEADACHE: Primary | ICD-10-CM

## 2022-11-18 PROCEDURE — 90674 CCIIV4 VAC NO PRSV 0.5 ML IM: CPT | Performed by: STUDENT IN AN ORGANIZED HEALTH CARE EDUCATION/TRAINING PROGRAM

## 2022-11-18 PROCEDURE — G8417 CALC BMI ABV UP PARAM F/U: HCPCS | Performed by: STUDENT IN AN ORGANIZED HEALTH CARE EDUCATION/TRAINING PROGRAM

## 2022-11-18 PROCEDURE — G8427 DOCREV CUR MEDS BY ELIG CLIN: HCPCS | Performed by: STUDENT IN AN ORGANIZED HEALTH CARE EDUCATION/TRAINING PROGRAM

## 2022-11-18 PROCEDURE — G8482 FLU IMMUNIZE ORDER/ADMIN: HCPCS | Performed by: STUDENT IN AN ORGANIZED HEALTH CARE EDUCATION/TRAINING PROGRAM

## 2022-11-18 PROCEDURE — 99214 OFFICE O/P EST MOD 30 MIN: CPT | Performed by: STUDENT IN AN ORGANIZED HEALTH CARE EDUCATION/TRAINING PROGRAM

## 2022-11-18 PROCEDURE — 1036F TOBACCO NON-USER: CPT | Performed by: STUDENT IN AN ORGANIZED HEALTH CARE EDUCATION/TRAINING PROGRAM

## 2022-11-18 RX ORDER — MECLIZINE HYDROCHLORIDE 25 MG/1
25 TABLET ORAL DAILY PRN
COMMUNITY
End: 2022-11-18 | Stop reason: SDUPTHER

## 2022-11-18 RX ORDER — MECLIZINE HYDROCHLORIDE 25 MG/1
25 TABLET ORAL DAILY PRN
Qty: 30 TABLET | Refills: 0 | Status: SHIPPED | OUTPATIENT
Start: 2022-11-18

## 2022-11-18 RX ORDER — ONDANSETRON 4 MG/1
4 TABLET, FILM COATED ORAL 3 TIMES DAILY PRN
Qty: 15 TABLET | Refills: 0 | Status: SHIPPED | OUTPATIENT
Start: 2022-11-18

## 2022-11-18 RX ORDER — SUMATRIPTAN 25 MG/1
25 TABLET, FILM COATED ORAL
Qty: 9 TABLET | Refills: 5 | Status: SHIPPED | OUTPATIENT
Start: 2022-11-18 | End: 2022-11-18

## 2022-11-18 RX ORDER — IBUPROFEN 800 MG/1
800 TABLET ORAL EVERY 8 HOURS PRN
Qty: 120 TABLET | Refills: 0 | Status: SHIPPED | OUTPATIENT
Start: 2022-11-18 | End: 2023-03-18

## 2022-11-18 RX ORDER — KETOROLAC TROMETHAMINE 30 MG/ML
60 INJECTION, SOLUTION INTRAMUSCULAR; INTRAVENOUS ONCE
Status: COMPLETED | OUTPATIENT
Start: 2022-11-18 | End: 2022-11-18

## 2022-11-18 RX ORDER — CYCLOBENZAPRINE HCL 10 MG
10 TABLET ORAL 3 TIMES DAILY PRN
COMMUNITY
Start: 2022-08-31

## 2022-11-18 RX ADMIN — KETOROLAC TROMETHAMINE 60 MG: 30 INJECTION, SOLUTION INTRAMUSCULAR; INTRAVENOUS at 12:44

## 2022-11-18 ASSESSMENT — PATIENT HEALTH QUESTIONNAIRE - PHQ9
SUM OF ALL RESPONSES TO PHQ QUESTIONS 1-9: 0
1. LITTLE INTEREST OR PLEASURE IN DOING THINGS: 0
SUM OF ALL RESPONSES TO PHQ QUESTIONS 1-9: 0
SUM OF ALL RESPONSES TO PHQ QUESTIONS 1-9: 0
2. FEELING DOWN, DEPRESSED OR HOPELESS: 0
SUM OF ALL RESPONSES TO PHQ9 QUESTIONS 1 & 2: 0
SUM OF ALL RESPONSES TO PHQ QUESTIONS 1-9: 0

## 2022-11-18 ASSESSMENT — ENCOUNTER SYMPTOMS
ABDOMINAL PAIN: 0
SORE THROAT: 0
NAUSEA: 1
WHEEZING: 0
VOMITING: 0
CHEST TIGHTNESS: 0
CONSTIPATION: 0
DIARRHEA: 0
EYE DISCHARGE: 0
SHORTNESS OF BREATH: 0
COUGH: 0

## 2022-11-18 NOTE — PROGRESS NOTES
[address] PRIMARY CARE  [address] [place] [address] [address] [place] [address]  Dept: [phone]  Dept Fax: [phone]    Ysabel Arzate is a 39 y.o. female who is a Established patient, who presents today for her medical conditions/complaints as noted below:  Chief Complaint   Patient presents with    Headache     They are back and been ongoing for 2 weeks         HPI:     She is here today complaining of worsening left-sided headaches for past few weeks. She says that she had similar headaches several months ago and they resolved at the time. She says that for past few weeks she has been having dizziness, nausea and left-sided headaches. She also feels stiffness in the left side of her neck, says that she has tried physical therapy before and it helped at the time but then her pain comes back again after she stops. She says that she has been taking ibuprofen and muscle relaxers. Says that the meclizine and Zofran to help with the dizziness and nausea and she needs a refill. She says that now with the headache she is also having some photosensitivity and noise sensitivity. She went to urgent care last week and was given a Toradol shot which helped. She is requesting another shot today because of her increased pain.       Hemoglobin A1C (%)   Date Value   03/18/2022 5.4   02/21/2020 5.7             ( goal A1Cis < 7)   No results found for: LABMICR  LDL Cholesterol (mg/dL)   Date Value   03/18/2022 115   02/21/2020 136 (H)       (goal LDL is <100)   AST (U/L)   Date Value   03/18/2022 14     ALT (U/L)   Date Value   03/18/2022 14     BUN (mg/dL)   Date Value   03/18/2022 11     BP Readings from Last 3 Encounters:   11/18/22 132/88   07/15/22 129/88   03/16/22 123/88          (goal 120/80)    Past Medical History:   Diagnosis Date    Depression     Irregular periods     Sleep disturbance       Past Surgical History:   Procedure Laterality Date    APPENDECTOMY      COLPOSCOPY  04/14/2020 Dr. Bashir Lopez- Pap Showed LGSIL + hpv- Bx taken ECC 11 and 1    KIDNEY SURGERY Bilateral        Family History   Problem Relation Age of Onset    Breast Cancer Maternal Cousin 55        2nd degree       Social History     Tobacco Use    Smoking status: Never    Smokeless tobacco: Never   Substance Use Topics    Alcohol use: Yes      Current Outpatient Medications   Medication Sig Dispense Refill    cyclobenzaprine (FLEXERIL) 10 MG tablet Take 10 mg by mouth 3 times daily as needed      ondansetron (ZOFRAN) 4 MG tablet Take 1 tablet by mouth 3 times daily as needed for Nausea or Vomiting 15 tablet 0    ibuprofen (ADVIL;MOTRIN) 800 MG tablet Take 1 tablet by mouth every 8 hours as needed for Pain 120 tablet 0    meclizine (ANTIVERT) 25 MG tablet Take 1 tablet by mouth daily as needed for Dizziness Take 25 mg by mouth daily as needed 30 tablet 0    SUMAtriptan (IMITREX) 25 MG tablet Take 1 tablet by mouth once as needed for Migraine 9 tablet 5    vitamin D (ERGOCALCIFEROL) 1.25 MG (78477 UT) CAPS capsule Take 1 capsule by mouth once a week 4 capsule 5    diclofenac sodium (VOLTAREN) 1 % GEL Apply topically 2 times daily 50 g 0     Current Facility-Administered Medications   Medication Dose Route Frequency Provider Last Rate Last Admin    ketorolac (TORADOL) injection 60 mg  60 mg IntraMUSCular Once Malik Rg MD        levonorgestrel (MIRENA) IUD 52 mg 1 each  1 each IntraUTERine Once Ana María Medina, DO   1 each at 03/09/20 0853     No Known Allergies    Health Maintenance   Topic Date Due    COVID-19 Vaccine (4 - Booster for Pfizer series) 04/20/2022    Flu vaccine (1) 08/01/2022    Depression Monitoring  09/08/2023    Cervical cancer screen  02/25/2025    Diabetes screen  03/18/2025    DTaP/Tdap/Td vaccine (2 - Td or Tdap) 01/27/2032    Hepatitis C screen  Completed    HIV screen  Completed    Hepatitis A vaccine  Aged Out    Hib vaccine  Aged Out    Meningococcal (ACWY) vaccine  Aged Out    Pneumococcal 0-64 years Vaccine  Aged Out    Varicella vaccine  Discontinued       Subjective:     Review of Systems   Constitutional:  Negative for appetite change, fatigue and fever. HENT:  Negative for congestion, ear pain, hearing loss and sore throat. Eyes:  Negative for discharge and visual disturbance. Respiratory:  Negative for cough, chest tightness, shortness of breath and wheezing. Cardiovascular:  Negative for chest pain, palpitations and leg swelling. Gastrointestinal:  Positive for nausea. Negative for abdominal pain, constipation, diarrhea and vomiting. Genitourinary:  Negative for flank pain, frequency, hematuria and urgency. Musculoskeletal:  Positive for neck pain and neck stiffness. Negative for arthralgias, gait problem, joint swelling and myalgias. Skin: Negative. Neurological:  Positive for dizziness and headaches. Negative for weakness and numbness. Psychiatric/Behavioral: Negative. Negative for dysphoric mood. The patient is not nervous/anxious. Objective:     Physical Exam  Vitals reviewed. Constitutional:       Appearance: Normal appearance. She is normal weight. HENT:      Head: Normocephalic and atraumatic. Nose: Nose normal.      Mouth/Throat:      Mouth: Mucous membranes are moist.      Pharynx: Oropharynx is clear. Eyes:      Extraocular Movements: Extraocular movements intact. Conjunctiva/sclera: Conjunctivae normal.      Pupils: Pupils are equal, round, and reactive to light. Cardiovascular:      Rate and Rhythm: Normal rate and regular rhythm. Heart sounds: Normal heart sounds. No murmur heard. No gallop. Pulmonary:      Effort: Pulmonary effort is normal. No respiratory distress. Breath sounds: Normal breath sounds. No stridor. No wheezing. Abdominal:      General: Bowel sounds are normal. There is no distension. Palpations: Abdomen is soft. Tenderness: There is no abdominal tenderness. There is no guarding or rebound. Musculoskeletal:         General: No swelling. Normal range of motion. Cervical back: Normal range of motion and neck supple. Spasms and tenderness present. Skin:     General: Skin is warm and dry. Coloration: Skin is not jaundiced. Findings: No rash. Neurological:      General: No focal deficit present. Mental Status: She is alert and oriented to person, place, and time. Psychiatric:         Mood and Affect: Mood normal.         Behavior: Behavior normal.         Thought Content: Thought content normal.         Judgment: Judgment normal.     /88   Pulse 99   Temp 97.6 °F (36.4 °C)   Ht 5' (1.524 m)   Wt 227 lb (103 kg)   SpO2 96%   BMI 44.33 kg/m²     Assessment/Plan:   1. Chronic left-sided headache  -     meclizine (ANTIVERT) 25 MG tablet; Take 1 tablet by mouth daily as needed for Dizziness Take 25 mg by mouth daily as needed, Disp-30 tablet, R-0Normal  -     SUMAtriptan (IMITREX) 25 MG tablet; Take 1 tablet by mouth once as needed for Migraine, Disp-9 tablet, R-5Normal  -     ketorolac (TORADOL) injection 60 mg; 60 mg, IntraMUSCular, ONCE, 1 dose, On Fri 11/18/22 at 1200Do not administer for more than 5 days. 2. Chronic neck pain  -     ibuprofen (ADVIL;MOTRIN) 800 MG tablet; Take 1 tablet by mouth every 8 hours as needed for Pain, Disp-120 tablet, R-0Normal  -     [facility] [place]  3. Nausea  -     ondansetron (ZOFRAN) 4 MG tablet; Take 1 tablet by mouth 3 times daily as needed for Nausea or Vomiting, Disp-15 tablet, R-0Normal  4. Needs flu shot  -     Influenza, FLUCELVAX, (age 10 mo+), IM, Preservative Free, 0.5 mL      Chronic left-sided headaches-left-sided neck stiffness and tenderness on exam, advised to start physical therapy and take muscle relaxers and NSAID as needed. Having migraine-like headaches with nausea and light and photosensitivity, prescribed Imitrex for acute episodes. Received Toradol shot today in office.   Takes meclizine as needed for dizziness. Last CT cervical spine done in 2021 was normal.      Return in about 3 months (around 2/18/2023) for headaches. Orders Placed This Encounter   Procedures    Influenza, FLUCELVAX, (age 10 mo+), IM, Preservative Free, 0.5 mL    [facility]  [place]     Referral Priority:   Routine     Referral Type:   Eval and Treat     Referral Reason:   Specialty Services Required     Requested Specialty:   Physical Therapist     Number of Visits Requested:   1     Orders Placed This Encounter   Medications    ondansetron (ZOFRAN) 4 MG tablet     Sig: Take 1 tablet by mouth 3 times daily as needed for Nausea or Vomiting     Dispense:  15 tablet     Refill:  0    ibuprofen (ADVIL;MOTRIN) 800 MG tablet     Sig: Take 1 tablet by mouth every 8 hours as needed for Pain     Dispense:  120 tablet     Refill:  0    meclizine (ANTIVERT) 25 MG tablet     Sig: Take 1 tablet by mouth daily as needed for Dizziness Take 25 mg by mouth daily as needed     Dispense:  30 tablet     Refill:  0    SUMAtriptan (IMITREX) 25 MG tablet     Sig: Take 1 tablet by mouth once as needed for Migraine     Dispense:  9 tablet     Refill:  5    ketorolac (TORADOL) injection 60 mg       Patient given educational materials - see patient instructions. Discussed use, benefit, and side effects of prescribed medications. All patientquestions answered. Pt voiced understanding. Reviewed health maintenance. Instructedto continue current medications, diet and exercise. Patient agreed with treatmentplan. Follow up as directed.      Electronically signed by Maycol Blum MD on 11/18/2022 at 11:49 AM

## 2022-11-21 ENCOUNTER — HOSPITAL ENCOUNTER (OUTPATIENT)
Dept: PHYSICAL THERAPY | Facility: CLINIC | Age: 37
Setting detail: THERAPIES SERIES
Discharge: HOME | End: 2022-11-21
Payer: COMMERCIAL

## 2022-11-21 PROCEDURE — 97140 MANUAL THERAPY 1/> REGIONS: CPT

## 2022-11-21 PROCEDURE — 97161 PT EVAL LOW COMPLEX 20 MIN: CPT
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
## 2023-02-20 ENCOUNTER — HOSPITAL ENCOUNTER (OUTPATIENT)
Age: 38
Setting detail: SPECIMEN
Discharge: HOME | End: 2023-02-20

## 2023-02-20 DIAGNOSIS — R30.0 DYSURIA: Primary | ICD-10-CM

## 2023-02-20 DIAGNOSIS — R30.0 DYSURIA: ICD-10-CM

## 2023-02-22 DIAGNOSIS — B96.89 UTI DUE TO KLEBSIELLA SPECIES: Primary | ICD-10-CM

## 2023-02-22 DIAGNOSIS — N39.0 UTI DUE TO KLEBSIELLA SPECIES: Primary | ICD-10-CM

## 2023-02-22 LAB
MICROORGANISM SPEC CULT: ABNORMAL
SPECIMEN DESCRIPTION: ABNORMAL

## 2023-02-22 RX ORDER — SULFAMETHOXAZOLE AND TRIMETHOPRIM 800; 160 MG/1; MG/1
1 TABLET ORAL 2 TIMES DAILY
Qty: 10 TABLET | Refills: 0 | Status: SHIPPED | OUTPATIENT
Start: 2023-02-22 | End: 2023-02-27

## 2023-02-23 ENCOUNTER — OFFICE VISIT (OUTPATIENT)
Dept: FAMILY MEDICINE CLINIC | Age: 38
End: 2023-02-23
Payer: COMMERCIAL

## 2023-02-23 VITALS
HEART RATE: 90 BPM | HEIGHT: 60 IN | OXYGEN SATURATION: 99 % | TEMPERATURE: 97.2 F | WEIGHT: 229.4 LBS | DIASTOLIC BLOOD PRESSURE: 78 MMHG | SYSTOLIC BLOOD PRESSURE: 124 MMHG | BODY MASS INDEX: 45.04 KG/M2

## 2023-02-23 DIAGNOSIS — R53.83 FATIGUE, UNSPECIFIED TYPE: ICD-10-CM

## 2023-02-23 DIAGNOSIS — Z13.1 SCREENING FOR DIABETES MELLITUS: ICD-10-CM

## 2023-02-23 DIAGNOSIS — E78.00 HYPERCHOLESTEROLEMIA: ICD-10-CM

## 2023-02-23 DIAGNOSIS — N39.0 UTI DUE TO KLEBSIELLA SPECIES: Primary | ICD-10-CM

## 2023-02-23 DIAGNOSIS — E55.9 VITAMIN D DEFICIENCY: ICD-10-CM

## 2023-02-23 DIAGNOSIS — B96.89 UTI DUE TO KLEBSIELLA SPECIES: Primary | ICD-10-CM

## 2023-02-23 PROCEDURE — G8427 DOCREV CUR MEDS BY ELIG CLIN: HCPCS | Performed by: STUDENT IN AN ORGANIZED HEALTH CARE EDUCATION/TRAINING PROGRAM

## 2023-02-23 PROCEDURE — 1036F TOBACCO NON-USER: CPT | Performed by: STUDENT IN AN ORGANIZED HEALTH CARE EDUCATION/TRAINING PROGRAM

## 2023-02-23 PROCEDURE — 99214 OFFICE O/P EST MOD 30 MIN: CPT | Performed by: STUDENT IN AN ORGANIZED HEALTH CARE EDUCATION/TRAINING PROGRAM

## 2023-02-23 PROCEDURE — G8417 CALC BMI ABV UP PARAM F/U: HCPCS | Performed by: STUDENT IN AN ORGANIZED HEALTH CARE EDUCATION/TRAINING PROGRAM

## 2023-02-23 PROCEDURE — G8482 FLU IMMUNIZE ORDER/ADMIN: HCPCS | Performed by: STUDENT IN AN ORGANIZED HEALTH CARE EDUCATION/TRAINING PROGRAM

## 2023-02-23 RX ORDER — ERGOCALCIFEROL 1.25 MG/1
50000 CAPSULE ORAL WEEKLY
Qty: 4 CAPSULE | Refills: 5 | Status: SHIPPED | OUTPATIENT
Start: 2023-02-23

## 2023-02-23 RX ORDER — PHENAZOPYRIDINE HYDROCHLORIDE 100 MG/1
TABLET, FILM COATED ORAL
COMMUNITY
Start: 2023-02-05

## 2023-02-23 SDOH — ECONOMIC STABILITY: INCOME INSECURITY: IN THE LAST 12 MONTHS, WAS THERE A TIME WHEN YOU WERE NOT ABLE TO PAY THE MORTGAGE OR RENT ON TIME?: NO

## 2023-02-23 SDOH — ECONOMIC STABILITY: FOOD INSECURITY: WITHIN THE PAST 12 MONTHS, YOU WORRIED THAT YOUR FOOD WOULD RUN OUT BEFORE YOU GOT MONEY TO BUY MORE.: NEVER TRUE

## 2023-02-23 SDOH — ECONOMIC STABILITY: HOUSING INSECURITY
IN THE LAST 12 MONTHS, WAS THERE A TIME WHEN YOU DID NOT HAVE A STEADY PLACE TO SLEEP OR SLEPT IN A SHELTER (INCLUDING NOW)?: NO

## 2023-02-23 SDOH — ECONOMIC STABILITY: FOOD INSECURITY: WITHIN THE PAST 12 MONTHS, THE FOOD YOU BOUGHT JUST DIDN'T LAST AND YOU DIDN'T HAVE MONEY TO GET MORE.: NEVER TRUE

## 2023-02-23 ASSESSMENT — PATIENT HEALTH QUESTIONNAIRE - PHQ9
5. POOR APPETITE OR OVEREATING: 0
SUM OF ALL RESPONSES TO PHQ QUESTIONS 1-9: 3
3. TROUBLE FALLING OR STAYING ASLEEP: 1
SUM OF ALL RESPONSES TO PHQ QUESTIONS 1-9: 3
SUM OF ALL RESPONSES TO PHQ QUESTIONS 1-9: 3
7. TROUBLE CONCENTRATING ON THINGS, SUCH AS READING THE NEWSPAPER OR WATCHING TELEVISION: 0
9. THOUGHTS THAT YOU WOULD BE BETTER OFF DEAD, OR OF HURTING YOURSELF: 0
6. FEELING BAD ABOUT YOURSELF - OR THAT YOU ARE A FAILURE OR HAVE LET YOURSELF OR YOUR FAMILY DOWN: 0
SUM OF ALL RESPONSES TO PHQ9 QUESTIONS 1 & 2: 2
SUM OF ALL RESPONSES TO PHQ QUESTIONS 1-9: 3
1. LITTLE INTEREST OR PLEASURE IN DOING THINGS: 1
8. MOVING OR SPEAKING SO SLOWLY THAT OTHER PEOPLE COULD HAVE NOTICED. OR THE OPPOSITE, BEING SO FIGETY OR RESTLESS THAT YOU HAVE BEEN MOVING AROUND A LOT MORE THAN USUAL: 0
4. FEELING TIRED OR HAVING LITTLE ENERGY: 0
2. FEELING DOWN, DEPRESSED OR HOPELESS: 1

## 2023-02-23 ASSESSMENT — SOCIAL DETERMINANTS OF HEALTH (SDOH): HOW HARD IS IT FOR YOU TO PAY FOR THE VERY BASICS LIKE FOOD, HOUSING, MEDICAL CARE, AND HEATING?: NOT HARD AT ALL

## 2023-02-23 NOTE — PROGRESS NOTES
[address] PRIMARY CARE  [address] [place] [address]  Dept: [phone]  Dept Fax: [phone]    Inder Melvin is a 44 y.o. female who is a Established patient, who presents today for her medical conditions/complaints as noted below:  Chief Complaint   Patient presents with    Migraine     Have improved tremendously. She claims she only gets them at most once a month         HPI:     She is here today to follow up on migraines. She says that her migraines have improved and were likely work stress related. She recently was treated for UTI at urgent care with macrobid. Her symptoms did not improve so she had urine culture done which was positive for Klebsiella. She was then put on Bactrim which she says has been helping. She is due for routine labs.       Hemoglobin A1C (%)   Date Value   03/18/2022 5.4   02/21/2020 5.7             ( goal A1Cis < 7)   No results found for: LABMICR  LDL Cholesterol (mg/dL)   Date Value   03/18/2022 115   02/21/2020 136 (H)       (goal LDL is <100)   AST (U/L)   Date Value   03/18/2022 14     ALT (U/L)   Date Value   03/18/2022 14     BUN (mg/dL)   Date Value   03/18/2022 11     BP Readings from Last 3 Encounters:   02/23/23 124/78   11/18/22 132/88   07/15/22 129/88          (goal 120/80)    Past Medical History:   Diagnosis Date    Depression     Irregular periods     Sleep disturbance       Past Surgical History:   Procedure Laterality Date    APPENDECTOMY      COLPOSCOPY  04/14/2020    Dr. Laura Vázquez- Pap Showed LGSIL + hpv- Bx taken [facility] 11 and 1    KIDNEY SURGERY Bilateral        Family History   Problem Relation Age of Onset    Breast Cancer Maternal Cousin 55        2nd degree       Social History     Tobacco Use    Smoking status: Never    Smokeless tobacco: Never   Substance Use Topics    Alcohol use: Yes      Current Outpatient Medications   Medication Sig Dispense Refill    phenazopyridine (PYRIDIUM) 100 MG tablet Take 1 tablet by mouth three times a day as needed for bladder spasms. vitamin D (ERGOCALCIFEROL) 1.25 MG (86593 UT) CAPS capsule Take 1 capsule by mouth once a week 4 capsule 5    sulfamethoxazole-trimethoprim (BACTRIM DS;SEPTRA DS) 800-160 MG per tablet Take 1 tablet by mouth 2 times daily for 5 days 10 tablet 0    cyclobenzaprine (FLEXERIL) 10 MG tablet Take 10 mg by mouth 3 times daily as needed      ondansetron (ZOFRAN) 4 MG tablet Take 1 tablet by mouth 3 times daily as needed for Nausea or Vomiting 15 tablet 0    ibuprofen (ADVIL;MOTRIN) 800 MG tablet Take 1 tablet by mouth every 8 hours as needed for Pain 120 tablet 0    meclizine (ANTIVERT) 25 MG tablet Take 1 tablet by mouth daily as needed for Dizziness Take 25 mg by mouth daily as needed 30 tablet 0    diclofenac sodium (VOLTAREN) 1 % GEL Apply topically 2 times daily 50 g 0    SUMAtriptan (IMITREX) 25 MG tablet Take 1 tablet by mouth once as needed for Migraine 9 tablet 5     Current Facility-Administered Medications   Medication Dose Route Frequency Provider Last Rate Last Admin    levonorgestrel (MIRENA) IUD 52 mg 1 each  1 each IntraUTERine Once Adam Urbano, DO   1 each at 03/09/20 0853     No Known Allergies    Health Maintenance   Topic Date Due    COVID-19 Vaccine (4 - Booster for Pfizer series) 04/20/2022    Depression Monitoring  02/23/2024    Cervical cancer screen  02/25/2025    Diabetes screen  03/18/2025    DTaP/Tdap/Td vaccine (2 - Td or Tdap) 01/27/2032    Flu vaccine  Completed    Hepatitis C screen  Completed    HIV screen  Completed    Hepatitis A vaccine  Aged Out    Hib vaccine  Aged Out    Meningococcal (ACWY) vaccine  Aged Out    Pneumococcal 0-64 years Vaccine  Aged Out    Varicella vaccine  Discontinued       Subjective:     Review of Systems   Constitutional:  Positive for fatigue. Negative for appetite change and fever. HENT:  Negative for congestion, ear pain, hearing loss and sore throat.     Eyes:  Negative for discharge and visual disturbance. Respiratory:  Negative for cough, chest tightness, shortness of breath and wheezing. Cardiovascular:  Negative for chest pain, palpitations and leg swelling. Gastrointestinal:  Negative for abdominal pain, constipation, diarrhea, nausea and vomiting. Genitourinary:  Positive for dysuria. Negative for flank pain, frequency, hematuria and urgency. Musculoskeletal:  Negative for arthralgias, gait problem, joint swelling and myalgias. Skin: Negative. Neurological:  Negative for dizziness, weakness, numbness and headaches. Psychiatric/Behavioral: Negative. Negative for dysphoric mood. The patient is not nervous/anxious. Objective:     Physical Exam  Vitals reviewed. Constitutional:       Appearance: Normal appearance. She is normal weight. HENT:      Head: Normocephalic and atraumatic. Nose: Nose normal.      Mouth/Throat:      Mouth: Mucous membranes are moist.      Pharynx: Oropharynx is clear. Eyes:      Extraocular Movements: Extraocular movements intact. Conjunctiva/sclera: Conjunctivae normal.      Pupils: Pupils are equal, round, and reactive to light. Cardiovascular:      Rate and Rhythm: Normal rate and regular rhythm. Heart sounds: Normal heart sounds. No murmur heard. No gallop. Pulmonary:      Effort: Pulmonary effort is normal. No respiratory distress. Breath sounds: Normal breath sounds. No stridor. No wheezing. Abdominal:      General: Bowel sounds are normal. There is no distension. Palpations: Abdomen is soft. Tenderness: There is no abdominal tenderness. There is no guarding or rebound. Musculoskeletal:         General: No swelling or tenderness. Normal range of motion. Cervical back: Normal range of motion and neck supple. Skin:     General: Skin is warm and dry. Coloration: Skin is not jaundiced. Findings: No rash. Neurological:      General: No focal deficit present.       Mental Status: She is alert and oriented to person, place, and time. Psychiatric:         Mood and Affect: Mood normal.         Behavior: Behavior normal.         Thought Content: Thought content normal.         Judgment: Judgment normal.     /78   Pulse 90   Temp 97.2 °F (36.2 °C)   Ht 5' (1.524 m)   Wt 229 lb 6.4 oz (104.1 kg)   SpO2 99%   BMI 44.80 kg/m²     Assessment/Plan:   1. UTI due to Klebsiella species  2. Hypercholesterolemia  -     Comprehensive Metabolic Panel, Fasting; Future  -     Lipid, Fasting; Future  3. Fatigue, unspecified type  -     Vitamin B12 & Folate; Future  -     Iron and TIBC; Future  -     Ferritin; Future  4. Vitamin D deficiency  -     vitamin D (ERGOCALCIFEROL) 1.25 MG (97332 UT) CAPS capsule; Take 1 capsule by mouth once a week, Disp-4 capsule, R-5Normal  -     Vitamin D 25 Hydroxy; Future  5. BMI 40.0-44.9, adult (HCC)  -     CBC with Auto Differential; Future  -     TSH with Reflex; Future  6. Screening for diabetes mellitus  -     Hemoglobin A1C; Future      UTI-recent urine culture positive for Klebsiella, on Bactrim 5-day course and taking Pyridium as needed for bladder spasms    Hyperlipidemia-last lipid panel elevated, not on any medications        Return in about 6 months (around 8/23/2023) for Follow up labs.     Orders Placed This Encounter   Procedures    CBC with Auto Differential     Standing Status:   Future     Standing Expiration Date:   8/23/2023    Comprehensive Metabolic Panel, Fasting     Standing Status:   Future     Standing Expiration Date:   8/23/2023    Hemoglobin A1C     Standing Status:   Future     Standing Expiration Date:   8/23/2023    Lipid, Fasting     Standing Status:   Future     Standing Expiration Date:   8/23/2023    TSH with Reflex     Standing Status:   Future     Standing Expiration Date:   8/23/2023    Vitamin D 25 Hydroxy     Standing Status:   Future     Standing Expiration Date:   8/23/2023    Vitamin B12 & Folate     Standing Status:   Future Standing Expiration Date:   2/23/2024    Iron and TIBC     Standing Status:   Future     Standing Expiration Date:   2/23/2024    Ferritin     Standing Status:   Future     Standing Expiration Date:   2/23/2024     Orders Placed This Encounter   Medications    vitamin D (ERGOCALCIFEROL) 1.25 MG (47449 UT) CAPS capsule     Sig: Take 1 capsule by mouth once a week     Dispense:  4 capsule     Refill:  5       Patient given educational materials - see patient instructions. Discussed use, benefit, and side effects of prescribed medications. All patientquestions answered. Pt voiced understanding. Reviewed health maintenance. Instructedto continue current medications, diet and exercise. Patient agreed with treatmentplan. Follow up as directed.      Electronically signed by Mer Quezada MD on 2/26/2023 at 6:09 PM

## 2023-02-26 PROBLEM — N39.0 UTI DUE TO KLEBSIELLA SPECIES: Status: ACTIVE | Noted: 2023-02-26

## 2023-02-26 PROBLEM — B96.89 UTI DUE TO KLEBSIELLA SPECIES: Status: ACTIVE | Noted: 2023-02-26

## 2023-02-26 ASSESSMENT — ENCOUNTER SYMPTOMS
SHORTNESS OF BREATH: 0
SORE THROAT: 0
DIARRHEA: 0
COUGH: 0
ABDOMINAL PAIN: 0
NAUSEA: 0
WHEEZING: 0
VOMITING: 0
CONSTIPATION: 0
EYE DISCHARGE: 0
CHEST TIGHTNESS: 0

## 2023-05-22 ENCOUNTER — HOSPITAL ENCOUNTER (OUTPATIENT)
Dept: PHYSICAL THERAPY | Facility: CLINIC | Age: 38
Setting detail: THERAPIES SERIES
Discharge: HOME | End: 2023-05-22
Payer: MEDICAID

## 2023-05-22 PROCEDURE — 97161 PT EVAL LOW COMPLEX 20 MIN: CPT

## 2023-05-22 PROCEDURE — 97140 MANUAL THERAPY 1/> REGIONS: CPT
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
## 2023-05-26 ENCOUNTER — HOSPITAL ENCOUNTER (OUTPATIENT)
Dept: PHYSICAL THERAPY | Facility: CLINIC | Age: 38
Setting detail: THERAPIES SERIES
Discharge: HOME | End: 2023-05-26
Payer: MEDICAID

## 2023-05-26 PROCEDURE — 97140 MANUAL THERAPY 1/> REGIONS: CPT

## 2023-05-26 PROCEDURE — 97110 THERAPEUTIC EXERCISES: CPT

## 2023-05-26 NOTE — FLOWSHEET NOTE
[] [facility]) [facility] &  Therapy  [address]  [phone]  F: [phone] [] [address]  [place] [address]   [address]  P: [phone]  F: [phone] [] [address]  Therapy  [address] [facility]  P: [phone]  F: [phone] [x] [address]  P: [phone]  F: [phone] [] [address]  [facility]   [address]   [place]: [phone]  F: [phone]      Physical Therapy Daily Treatment Note    Date:  2023  Patient Name:  Blu Riley    :  1985  MRN: [id number]  Physician: Angelo Sanchez DC           Insurance: Medicaid   Medical Diagnosis: Cervical Pain                 Rehab Codes: M54.2  Onset date: ~23               Next 's appt.: TBA pending PT  Visit# / total visits:     Cancels/No Shows: 0/0    Subjective:    Pain:  [] Yes  [] No Location: Bilat neck  Pain Rating: (0-10 scale) 7/10  Pain altered Tx:  [] No  [] Yes  Action:  Comments: Pt arrives noting tightness around neck. Agreeable to trial DN today    Objective:  Frequency:  2 x/week for 20 visits     Todays Treatment:     Exercises:  Exercise  Cervical neck pain    Reps/ Time Weight/ Level Comments   UBE  2'   fwd             Seated UT stretch 2x20\"       Seated Levator stretch 2x20\"       Seated scap retractor 2x10                 Supine deep neck flexor 10x                                                                                       Other:     Integrative Dry Needling: Bilateral cervical paraspinals approx C4/C5 without twitch response noted. Initiated Dry Needling this date with all risks and benefits explained, verbal and paper consent given prior to initiation.  No adverse effects noted post.   Dry Needling perfomed in conjunction with

## 2023-06-01 ENCOUNTER — HOSPITAL ENCOUNTER (OUTPATIENT)
Dept: PHYSICAL THERAPY | Facility: CLINIC | Age: 38
Setting detail: THERAPIES SERIES
Discharge: HOME | End: 2023-06-01
Payer: MEDICAID

## 2023-06-01 PROCEDURE — 97110 THERAPEUTIC EXERCISES: CPT

## 2023-06-01 PROCEDURE — 97140 MANUAL THERAPY 1/> REGIONS: CPT

## 2023-06-01 NOTE — FLOWSHEET NOTE
[] [facility]) [facility] &  Therapy  [address]  [phone]  F: [phone] [] [address]  [place] [address]   [address]  P: [phone]  F: [phone] [] [address]  Therapy  [address] [facility] [address]  P: [phone]  F: [phone] [x] [address]  P: [phone]  F: [phone] [] [address]  [facility]   [address]   [place]: [phone]  F: [phone]      Physical Therapy Daily Treatment Note    Date:  2023  Patient Name:  Tommy Crabtree    :  1985  MRN: [id number]  Physician: Conrad Chauhan DC           Insurance: Medicaid   Medical Diagnosis: Cervical Pain                 Rehab Codes: M54.2  Onset date: ~23               Next 's appt.: TBA pending PT  Visit# / total visits: 3/20    Cancels/No Shows: 0/0    Subjective:    Pain:  [] Yes  [] No Location: Bilat neck  Pain Rating: (0-10 scale) 6-7/10  Pain altered Tx:  [] No  [x] Yes  Action: MHP after DN  Comments: Pt arrives with a headache, starts in UT and radiates up into base of skull. Unsure of cause. Objective:  Frequency:  2 x/week for 20 visits     Todays Treatment:     Exercises:  Exercise  Cervical neck pain    Reps/ Time Weight/ Level Comments   UBE  2'   fwd             Seated UT stretch 2x20\"       Seated Levator stretch 2x20\"       Seated scap retractor 2x10                 Supine deep neck flexor 10x                                                                                       Other:     Integrative Dry Needling: Bilateral cervical paraspinals approx C4/C5 without twitch response noted. Initiated Dry Needling this date with all risks and benefits explained, verbal and paper consent given prior to initiation.  No adverse effects noted post.

## 2023-06-02 ENCOUNTER — HOSPITAL ENCOUNTER (OUTPATIENT)
Dept: PHYSICAL THERAPY | Facility: CLINIC | Age: 38
Setting detail: THERAPIES SERIES
Discharge: HOME | End: 2023-06-02
Payer: MEDICAID

## 2023-06-02 DIAGNOSIS — G89.29 CHRONIC NECK PAIN: Primary | ICD-10-CM

## 2023-06-02 DIAGNOSIS — M54.2 CHRONIC NECK PAIN: Primary | ICD-10-CM

## 2023-06-02 PROCEDURE — 97140 MANUAL THERAPY 1/> REGIONS: CPT

## 2023-06-02 PROCEDURE — 97110 THERAPEUTIC EXERCISES: CPT

## 2023-06-02 NOTE — FLOWSHEET NOTE
[] [address] [facility] [facility] CENTER &  Therapy  [address]  [phone]  F: [phone] [] [address]  [place] [address]   [address]  P: [phone]  F: [phone] [] [address]  [address] [facility] [address]  P: [phone]  F: [phone] [x] [address]  P: [phone]  F: [phone] [] [address]  [facility]   [address]   [place]: [phone]  F: [phone]      Physical Therapy Daily Treatment Note    Date:  2023  Patient Name:  Lizandro Friday    :  1985  MRN: [id number]  Physician: Chepe Peck DC           Insurance: Medicaid   Medical Diagnosis: Cervical Pain                 Rehab Codes: M54.2  Onset date: ~23               Next 's appt.: TBA pending PT  Visit# / total visits:     Cancels/No Shows: 0/0    Subjective:    Pain:  [] Yes  [] No Location: Headache   Pain Rating: (0-10 scale) 6-7/10  Pain altered Tx:  [] No  [x] Yes  Action: HELD DN and MFR  Comments: Pt arrives with a headache, states to having increased neck soreness after yesterday's visit. Agreeable to hold DN this date    Objective:  Frequency:  2 x/week for 20 visits     Todays Treatment:     Exercises:  Exercise  Cervical neck pain    Reps/ Time Weight/ Level Comments   UBE  2'  - fwd             Seated UT stretch 2x20\"  x     Seated Levator stretch 2x20\"  x     Seated scap retractor 2x10  x               Supine deep neck flexor 10x  -                                                                                     Other:     HELD : Integrative Dry Needling: Bilateral cervical paraspinals approx C4/C5 without twitch response noted.   Initiated Dry Needling this date with all risks and benefits explained, verbal and paper consent given prior to

## 2023-06-07 ENCOUNTER — HOSPITAL ENCOUNTER (OUTPATIENT)
Dept: PHYSICAL THERAPY | Facility: CLINIC | Age: 38
Setting detail: THERAPIES SERIES
Discharge: HOME | End: 2023-06-07
Payer: MEDICAID

## 2023-06-07 NOTE — FLOWSHEET NOTE
[] [address]  [address]    [phone]  F: [phone]   [] [address]  [facility] [address]   [address]  P: [phone]  F: [phone]  [] [address]  [address] [facility]  P: [phone]  F: [phone] [x] [address]  P: [phone]  F: [phone]  [] [address]  [address] [facility] [address]   [address]   [place]: [phone]  F: [phone]   [] [place] [address] [facility] [address]  [place]: [phone]   F: [phone]     Physical Therapy Cancel/No Show note    Date: 2023  Patient: Bella Garcia  : 1985  MRN: [id number]    Cancels/No Shows to date:     For today's appointment patient:    [x]  Cancelled    [] Rescheduled appointment    [] No-show     Reason given by patient:    []  Patient ill    []  Conflicting appointment    [] No transportation      [] Conflict with work    [] No reason given    [] Weather related    [] COVID-19    [x] Other:      Comments:  Pt left VM wishing to discontinue PT d/t continued headaches. Per discussion with pt at last visit, pt has since reached out to MD who per notes in [facility] has referred pt to pain management.        [] Next appointment was confirmed    Electronically signed by: Yoshi Vergara PT

## 2023-07-11 ENCOUNTER — HOSPITAL ENCOUNTER (OUTPATIENT)
Dept: MRI IMAGING | Age: 38
Discharge: HOME | End: 2023-07-13
Attending: PAIN MEDICINE
Payer: COMMERCIAL

## 2023-07-11 ENCOUNTER — HOSPITAL ENCOUNTER (OUTPATIENT)
Dept: GENERAL RADIOLOGY | Age: 38
Discharge: HOME | End: 2023-07-13
Attending: PAIN MEDICINE
Payer: COMMERCIAL

## 2023-07-11 ENCOUNTER — HOSPITAL ENCOUNTER (OUTPATIENT)
Age: 38
Discharge: HOME | End: 2023-07-13
Attending: PAIN MEDICINE
Payer: COMMERCIAL

## 2023-07-11 DIAGNOSIS — M54.6 CHRONIC BILATERAL THORACIC BACK PAIN: ICD-10-CM

## 2023-07-11 DIAGNOSIS — G89.29 CHRONIC BILATERAL THORACIC BACK PAIN: ICD-10-CM

## 2023-07-11 DIAGNOSIS — M47.812 CERVICAL SPONDYLOSIS: ICD-10-CM

## 2023-07-11 PROCEDURE — 72146 MRI CHEST SPINE W/O DYE: CPT

## 2023-07-11 PROCEDURE — 72141 MRI NECK SPINE W/O DYE: CPT

## 2023-07-11 PROCEDURE — 72072 X-RAY EXAM THORAC SPINE 3VWS: CPT

## 2023-07-17 ENCOUNTER — OFFICE VISIT (OUTPATIENT)
Dept: PAIN MANAGEMENT | Age: 38
End: 2023-07-17
Payer: COMMERCIAL

## 2023-07-17 VITALS — HEIGHT: 60 IN | BODY MASS INDEX: 44.76 KG/M2 | WEIGHT: 228 LBS

## 2023-07-17 DIAGNOSIS — M47.814 THORACIC SPONDYLOSIS: ICD-10-CM

## 2023-07-17 DIAGNOSIS — M47.812 CERVICAL SPONDYLOSIS: Primary | ICD-10-CM

## 2023-07-17 PROCEDURE — 99213 OFFICE O/P EST LOW 20 MIN: CPT | Performed by: PAIN MEDICINE
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
## 2023-09-18 ENCOUNTER — HOSPITAL ENCOUNTER (OUTPATIENT)
Dept: PHYSICAL THERAPY | Facility: CLINIC | Age: 38
Setting detail: THERAPIES SERIES
Discharge: HOME | End: 2023-09-18
Payer: COMMERCIAL

## 2023-09-18 PROCEDURE — 97140 MANUAL THERAPY 1/> REGIONS: CPT

## 2023-09-18 PROCEDURE — 97161 PT EVAL LOW COMPLEX 20 MIN: CPT

## 2023-09-22 ENCOUNTER — HOSPITAL ENCOUNTER (OUTPATIENT)
Dept: PHYSICAL THERAPY | Facility: CLINIC | Age: 38
Setting detail: THERAPIES SERIES
Discharge: HOME | End: 2023-09-22
Payer: COMMERCIAL

## 2023-09-22 PROCEDURE — 97140 MANUAL THERAPY 1/> REGIONS: CPT

## 2023-09-25 ENCOUNTER — HOSPITAL ENCOUNTER (OUTPATIENT)
Dept: PHYSICAL THERAPY | Facility: CLINIC | Age: 38
Setting detail: THERAPIES SERIES
Discharge: HOME | End: 2023-09-25
Payer: COMMERCIAL

## 2023-09-25 PROCEDURE — 97140 MANUAL THERAPY 1/> REGIONS: CPT

## 2023-09-29 ENCOUNTER — HOSPITAL ENCOUNTER (OUTPATIENT)
Dept: PHYSICAL THERAPY | Facility: CLINIC | Age: 38
Setting detail: THERAPIES SERIES
Discharge: HOME | End: 2023-09-29
Payer: COMMERCIAL

## 2023-09-29 PROCEDURE — 97140 MANUAL THERAPY 1/> REGIONS: CPT

## 2024-01-10 ENCOUNTER — E-VISIT (OUTPATIENT)
Dept: PRIMARY CARE CLINIC | Age: 39
End: 2024-01-10

## 2024-01-10 DIAGNOSIS — J06.9 VIRAL UPPER RESPIRATORY TRACT INFECTION: Primary | ICD-10-CM

## 2024-01-10 PROCEDURE — 99422 OL DIG E/M SVC 11-20 MIN: CPT | Performed by: NURSE PRACTITIONER

## 2024-01-10 RX ORDER — METHYLPREDNISOLONE 4 MG/1
TABLET ORAL
Qty: 1 KIT | Refills: 0 | Status: SHIPPED | OUTPATIENT
Start: 2024-01-10 | End: 2024-01-16

## 2024-01-10 ASSESSMENT — LIFESTYLE VARIABLES: SMOKING_STATUS: NO, I'VE NEVER SMOKED

## 2024-01-10 NOTE — PROGRESS NOTES
Gema Salinas (1985) initiated an asynchronous digital communication through [employer].    HPI: per patient questionnaire     Exam: not applicable    Diagnoses and all orders for this visit:  Diagnoses and all orders for this visit:    Viral upper respiratory tract infection    Other orders  -     methylPREDNISolone (MEDROL, ES,) 4 MG tablet; Take as directed      Symptoms started 3 days ago.  This is likely viral in nature.  She is agreeable to steroids.  Recommend supportive care measures for at least 7 to 10 days before antibiotics.  Suggestions provided.  Follow-up with PCP as needed    Time: EV2 - 11-20 minutes were spent on the digital evaluation and management of this patient. 18 min     Nancy Ken, KLAUS - CNP

## 2024-01-17 DIAGNOSIS — R09.81 SINUS CONGESTION: Primary | ICD-10-CM

## 2024-01-17 RX ORDER — AZITHROMYCIN 250 MG/1
250 TABLET, FILM COATED ORAL SEE ADMIN INSTRUCTIONS
Qty: 6 TABLET | Refills: 0 | Status: SHIPPED | OUTPATIENT
Start: 2024-01-17 | End: 2024-01-22